# Patient Record
Sex: FEMALE | Race: ASIAN | NOT HISPANIC OR LATINO | Employment: FULL TIME | URBAN - METROPOLITAN AREA
[De-identification: names, ages, dates, MRNs, and addresses within clinical notes are randomized per-mention and may not be internally consistent; named-entity substitution may affect disease eponyms.]

---

## 2017-02-14 ENCOUNTER — APPOINTMENT (OUTPATIENT)
Dept: INTERNAL MEDICINE | Facility: MEDICAL CENTER | Age: 62
End: 2017-02-14
Payer: COMMERCIAL

## 2017-04-17 PROBLEM — N20.0 NEPHROLITHIASIS: Status: ACTIVE | Noted: 2017-04-17

## 2017-04-17 PROBLEM — Z00.00 HEALTHCARE MAINTENANCE: Status: ACTIVE | Noted: 2017-04-17

## 2017-04-18 ENCOUNTER — OFFICE VISIT (OUTPATIENT)
Dept: INTERNAL MEDICINE | Facility: MEDICAL CENTER | Age: 62
End: 2017-04-18
Payer: COMMERCIAL

## 2017-04-18 VITALS
SYSTOLIC BLOOD PRESSURE: 130 MMHG | BODY MASS INDEX: 22.86 KG/M2 | TEMPERATURE: 97.9 F | OXYGEN SATURATION: 98 % | HEART RATE: 69 BPM | DIASTOLIC BLOOD PRESSURE: 86 MMHG | WEIGHT: 124.2 LBS | HEIGHT: 62 IN

## 2017-04-18 DIAGNOSIS — N20.0 NEPHROLITHIASIS: ICD-10-CM

## 2017-04-18 DIAGNOSIS — R20.0 HAND NUMBNESS: ICD-10-CM

## 2017-04-18 DIAGNOSIS — N83.209 CYST OF OVARY, UNSPECIFIED LATERALITY: ICD-10-CM

## 2017-04-18 DIAGNOSIS — M85.9 DISORDER OF BONE DENSITY AND STRUCTURE, UNSPECIFIED: ICD-10-CM

## 2017-04-18 DIAGNOSIS — Z12.4 ROUTINE CERVICAL SMEAR: ICD-10-CM

## 2017-04-18 DIAGNOSIS — M81.0 OSTEOPOROSIS: ICD-10-CM

## 2017-04-18 DIAGNOSIS — Z00.00 HEALTHCARE MAINTENANCE: ICD-10-CM

## 2017-04-18 DIAGNOSIS — M77.11 LATERAL EPICONDYLITIS OF RIGHT ELBOW: ICD-10-CM

## 2017-04-18 DIAGNOSIS — Z01.419 PAP SMEAR, LOW-RISK: ICD-10-CM

## 2017-04-18 DIAGNOSIS — K63.5 COLON POLYP: ICD-10-CM

## 2017-04-18 DIAGNOSIS — E03.9 ACQUIRED HYPOTHYROIDISM: ICD-10-CM

## 2017-04-18 DIAGNOSIS — Z12.31 VISIT FOR SCREENING MAMMOGRAM: ICD-10-CM

## 2017-04-18 DIAGNOSIS — E78.5 DYSLIPIDEMIA: ICD-10-CM

## 2017-04-18 DIAGNOSIS — M54.50 LOW BACK PAIN WITHOUT SCIATICA, UNSPECIFIED BACK PAIN LATERALITY, UNSPECIFIED CHRONICITY: ICD-10-CM

## 2017-04-18 PROCEDURE — 99000 SPECIMEN HANDLING OFFICE-LAB: CPT | Performed by: INTERNAL MEDICINE

## 2017-04-18 PROCEDURE — 99396 PREV VISIT EST AGE 40-64: CPT | Performed by: INTERNAL MEDICINE

## 2017-04-18 NOTE — ASSESSMENT & PLAN NOTE
Patient is feeling well on their current thyroid dose. The most recent thyroid function tests were normal. No unusual fatigue, skin changes, depression, constipation. Patient is compliant with medication. Takes medication as directed on an empty stomach.

## 2017-04-18 NOTE — ASSESSMENT & PLAN NOTE
Patient denies any signs/symptoms of cardiovascular disease. Denies chest pain/pressure; denies numbness/weakness or difficulty with speech/swallowing or sudden change in vision.

## 2017-04-18 NOTE — PROGRESS NOTES
Established Patient    Jasmin Gates is a 62 y.o. female who presents today with the following:    CC: Here for yearly visit. Only complaint is arm pain which seems to be improving    HPI:    Acquired hypothyroidism  Patient is feeling well on their current thyroid dose. The most recent thyroid function tests were normal. No unusual fatigue, skin changes, depression, constipation. Patient is compliant with medication. Takes medication as directed on an empty stomach.          Dyslipidemia  Patient denies any signs/symptoms of cardiovascular disease. Denies chest pain/pressure; denies numbness/weakness or difficulty with speech/swallowing or sudden change in vision.         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     Nephrolithiasis  Not so good about lots water- but no flank pain.     Hand numbness  Hx CTS>>better    Low back pain  Better with yoga tiw    Osteoporosis  No falls or frxs.  No SE with meds.     Colon polyp  Due for f/u- no blood or BM changes.     Healthcare maintenance  Sees eye doc and dentist    Reg exercise and eats well.     Ovarian cyst  Denies any further pain or problems        ROS: As per HPI. Additional pertinent symptoms as noted below.    ROS:  Constitutional ROS: No unexpected change in weight, No weakness, No fatigue  Eye ROS: No recent significant change in vision  Pulmonary ROS: No shortness of breath, No recent change in breathing  Cardiovascular ROS: No chest pain, No edema, No palpitations, No syncope  Gastrointestinal ROS: No abdominal pain, No nausea, vomiting, diarrhea, or constipation  Psychiatric ROS: No depression, No anxiety    Patient Active Problem List    Diagnosis Date Noted   • Lateral epicondylitis of right elbow 04/18/2017   • Colon polyp 04/18/2017   • Nephrolithiasis 04/17/2017   • Healthcare maintenance 04/17/2017   • Hand numbness 07/11/2016   • Low back pain 07/11/2016   •  "Ovarian cyst 07/11/2016   • Dry eye of left side 07/08/2016   • Acquired hypothyroidism 07/08/2016   • Dyslipidemia 07/08/2016   • Osteoporosis 07/08/2016       Social History   Substance Use Topics   • Smoking status: Never Smoker    • Smokeless tobacco: Never Used   • Alcohol Use: No       Current Outpatient Prescriptions   Medication Sig Dispense Refill   • alendronate (FOSAMAX) 70 MG Tab Take 70 mg by mouth every 7 days.     • Magnesium 400 MG Cap Take  by mouth.     • Red Yeast Rice Extract (RED YEAST RICE PO) Take 1,200 mg by mouth every day.     • levothyroxine (SYNTHROID) 50 MCG TABS Take 50 mcg by mouth every day.     • Multiple Vitamins-Minerals (CENTRUM PO) Take  by mouth.     • Calcium Citrate (CITRACAL PO) Take  by mouth.     • Omega-3 Fatty Acids (FISH OIL PO) Take  by mouth.     • Cholecalciferol (VITAMIN D-3 PO) Take 2,000 Units by mouth every day.     • Coenzyme Q10 (COQ10 PO) Take  by mouth.     • Multiple Vitamins-Minerals (PRESERVISION AREDS 2 PO) Take  by mouth.       No current facility-administered medications for this visit.       /86 mmHg  Pulse 69  Temp(Src) 36.6 °C (97.9 °F)  Ht 1.581 m (5' 2.25\")  Wt 56.337 kg (124 lb 3.2 oz)  BMI 22.54 kg/m2  SpO2 98%    Physical Exam   General:   No distress.  Normal appearing.  HEENT: Pupils are equal.  Conjunctiva is normal.  Head is normal appearing.  Ears, canals and tympanic membranes are normal.  Oral cavity is pink and moist without lesion.  Neck: Supple without JVD or bruit.  Thyroid is not enlarged.  Pulmonary: Clear to auscultation, with good breath sounds.  Cardiovascular regular rate and rhythm.  No murmur auscultated. No carotid bruits.  Abdomen: Soft, nontender, nondistended.  Normal bowel sounds.  Organs are not enlarged.  Neurologic: Cranial nerves intact.  Strength and sensation are normal.  Skin: No obvious lesions.  Lymph: No cervical, supraclavicular, axillary nodes noted.  Genitourinary: External genitalia are normal in " appearance. Speculum exam- normal cervix and mucosa. Pap collected   Breast: Bilateral breasts are normal in appearance. Nipple and areola are normal in appearance. No abnormal skin texture. No masses   Right arm tender over the lateral epicondyle particularly with internal/external rotation of the arm              Assessment and Plan      1. Acquired hypothyroidism  Thyroid labs are within range. Patient is taking meds as prescribed first thing in morning without food. And has no signs or symptoms of lower hypothyroid.      - TSH WITH REFLEX TO FT4; Future    2. Dyslipidemia  Lipids are in reasonable range. The patient to continue paying attention to diet and exercise. Patient aware to go to emergency department or call 911 if any signs of cardiovascular disease- chest pain or pressure, sudden numbness or weakness in an arm or leg, sudden loss of ability to talk or swallow.      - COMP METABOLIC PANEL; Future  - LIPID PROFILE; Future    3. Visit for screening mammogram    - MA-SCREEN MAMMO W/CAD-BILAT; Future    4. Disorder of bone density and structure, unspecified    - DS-BONE DENSITY STUDY (DEXA); Future    5. Routine cervical smear    - THINPREP PAP WITH HPV; Future    6. Lateral epicondylitis of right elbow  Information given. Seems to be resolving. Recommend tennis elbow band. Call if not better.    7. Nephrolithiasis  Encourage being better about fluids    8. Hand numbness  History carpal tunnel now resolved    9. Low back pain without sciatica, unspecified back pain laterality, unspecified chronicity  Resolved with yoga    10. Osteoporosis  No falls or fractures on Fosamax DEXA scan due    11. Colon polyp  No change in bowel habits screening due per patient.  - REFERRAL TO GASTROENTEROLOGY    12. Healthcare maintenance  Up-to-date with vision and dental screening. Good about exercise and diet. Lives with her father. She cares  States life is good denies depression spends time with family    13. Cyst of  ovary, unspecified laterality  Seemingly resolved by exam and history      Return in about 1 year (around 4/18/2018).      Signed by: Tayla Wallace M.D.    This note was created using voice recognition software. There may be unintended errors in spelling, grammar or content.

## 2017-04-18 NOTE — MR AVS SNAPSHOT
"        Jasmin Gates   2017 3:20 PM   Office Visit   MRN: [id number]    Department:  [facility]   Dept Phone:  [phone]    Description:  Female : 1955   Provider:  Tayla Wallace M.D.           Reason for Visit     Gynecologic Exam Pap smear     Arm Pain x 3 months     Referral Needed Colon with GI Cons       Allergies as of 2017     No Known Allergies      You were diagnosed with     Acquired hypothyroidism   [6691566]       Dyslipidemia   [820466]       Visit for screening mammogram   [169137]       Disorder of bone density and structure, unspecified   [8271136]       Routine cervical smear   [258393]       Lateral epicondylitis of right elbow   [734759]       Nephrolithiasis   [254181]       Hand numbness   [634061]       Low back pain without sciatica, unspecified back pain laterality, unspecified chronicity   [1552430]       Osteoporosis   [9697105]       Colon polyp   [218942]       Healthcare maintenance   [685630]       Cyst of ovary, unspecified laterality   [0322777]       Pap smear, low-risk   [581162]         Vital Signs     Blood Pressure Pulse Temperature Height Weight Body Mass Index    130/86 mmHg 69 36.6 °C (97.9 °F) 1.581 m (5' 2.25\") 56.337 kg (124 lb 3.2 oz) 22.54 kg/m2    Oxygen Saturation Smoking Status                98% Never Smoker           Basic Information     Date Of Birth Sex Race Ethnicity Preferred Language    1955 Female  Non- English      Problem List              ICD-10-CM Priority Class Noted - Resolved    Acquired hypothyroidism E03.9   2016 - Present    Dyslipidemia E78.5   2016 - Present    Osteoporosis M81.0   2016 - Present    Nephrolithiasis N20.0   2017 - Present    Healthcare maintenance Z00.00   2017 - Present    Lateral epicondylitis of right elbow M77.11   2017 - Present    Colon polyp K63.5   2017 - Present      Health Maintenance        Date Due Completion Dates    PAP SMEAR 1978 " ---    MAMMOGRAM 3/18/2017 3/18/2016, 3/17/2015, 7/20/2007, 7/20/2007, 7/5/2006, 6/28/2005    IMM DTaP/Tdap/Td Vaccine (2 - Td) 7/27/2017 7/27/2007    COLONOSCOPY 1/18/2022 1/18/2012            Current Immunizations     Hepatitis A Vaccine, Adult 1/30/2008, 7/27/2007    Hepatitis B Vaccine Recombivax (Adol/Adult) 3/3/1999, 9/30/1998, 8/26/1998    Influenza Vaccine Quad Inj (Pf) 11/3/2016 12:23 PM, 11/5/2015  9:53 AM    Influenza Vaccine Quad Inj (Preserved) 11/3/2016, 10/7/2014    Pneumococcal polysaccharide vaccine (PPSV-23) 10/15/1997    SHINGLES VACCINE 8/7/2013    Tdap Vaccine 7/27/2007      Below and/or attached are the medications your provider expects you to take. Review all of your home medications and newly ordered medications with your provider and/or pharmacist. Follow medication instructions as directed by your provider and/or pharmacist. Please keep your medication list with you and share with your provider. Update the information when medications are discontinued, doses are changed, or new medications (including over-the-counter products) are added; and carry medication information at all times in the event of emergency situations     Allergies:  No Known Allergies          Medications  Valid as of: April 19, 2017 -  3:30 PM    Generic Name Brand Name Tablet Size Instructions for use    Alendronate Sodium (Tab) FOSAMAX 70 MG Take 70 mg by mouth every 7 days.        Calcium Citrate   Take  by mouth.        Cholecalciferol   Take 2,000 Units by mouth every day.        Coenzyme Q10   Take  by mouth.        Levothyroxine Sodium (Tab) SYNTHROID 50 MCG Take 50 mcg by mouth every day.        Magnesium (Cap) Magnesium 400 MG Take  by mouth.        Multiple Vitamins-Minerals   Take  by mouth.        Multiple Vitamins-Minerals   Take  by mouth.        Omega-3 Fatty Acids   Take  by mouth.        Red Yeast Rice Extract   Take 1,200 mg by mouth every day.        .                 Medicines prescribed today were  sent to:     [id number] [place], [place] - [address] [facility]    [address] [place] [address] [place] [address]    Phone: [phone] Fax: [phone]    Open 24 Hours?: No      Medication refill instructions:       If your prescription bottle indicates you have medication refills left, it is not necessary to call your provider’s office. Please contact your pharmacy and they will refill your medication.    If your prescription bottle indicates you do not have any refills left, you may request refills at any time through one of the following ways: The online [employer] system (except Urgent Care), by calling your provider’s office, or by asking your pharmacy to contact your provider’s office with a refill request. Medication refills are processed only during regular business hours and may not be available until the next business day. Your provider may request additional information or to have a follow-up visit with you prior to refilling your medication.   *Please Note: Medication refills are assigned a new Rx number when refilled electronically. Your pharmacy may indicate that no refills were authorized even though a new prescription for the same medication is available at the pharmacy. Please request the medicine by name with the pharmacy before contacting your provider for a refill.        Your To Do List     Future Labs/Procedures Complete By Expires    COMP METABOLIC PANEL  As directed 4/19/2018    DS-BONE DENSITY STUDY (DEXA)  As directed 10/19/2017    LIPID PROFILE  As directed 4/19/2018    MA-SCREEN MAMMO W/CAD-BILAT  As directed 5/20/2018    THINPREP PAP WITH HPV  As directed 4/19/2018    TSH WITH REFLEX TO FT4  As directed 4/18/2018      Referral     A referral request has been sent to our patient care coordination department. Please allow 3-5 business days for us to process this request and contact you either by phone or mail. If you do not hear from us by the 5th business day, please call us at [phone].           Instructions    Tennis Elbow  Tennis elbow (lateral epicondylitis) is inflammation of the outer tendons of your forearm close to your elbow. Your tendons attach your muscles to your bones. The outer tendons of your forearm are used to extend your wrist, and they attach on the outside part of your elbow. Tennis elbow is often found in people who play tennis, but anyone may get the condition from repeatedly extending the wrist or turning the forearm.  CAUSES  This condition is caused by repeatedly extending your wrist and using your hands. It can result from sports or work that requires repetitive forearm movements. Tennis elbow may also be caused by an injury.  RISK FACTORS  You have a higher risk of developing tennis elbow if you play tennis or another racquet sport. You also have a higher risk if you frequently use your hands for work. This condition is also more likely to develop in:  · Musicians.  · , painters, and plumbers.  · Cooks.  · [place].  · People who work in factories.  · Construction workers.  · Butchers.  · People who use computers.  SYMPTOMS  Symptoms of this condition include:  · Pain and tenderness in your forearm and the outer part of your elbow. You may only feel the pain when you use your arm, or you may feel it even when you are not using your arm.  · A burning feeling that runs from your elbow through your arm.  · Weak  in your hands.  DIAGNOSIS   This condition may be diagnosed by medical history and physical exam. You may also have other tests, including:  · X-rays.  · MRI.  TREATMENT  Your health care provider will recommend lifestyle adjustments, such as resting and icing your arm. Treatment may also include:  · Medicines for inflammation. This may include shots of cortisone if your pain continues.  · Physical therapy. This may include massage or exercises.  · An elbow brace.  Surgery may eventually be recommended if your pain does not go away with treatment.  HOME CARE  INSTRUCTIONS  Activity  · Rest your elbow and wrist as directed by your health care provider. Try to avoid any activities that caused the problem until your health care provider says that you can do them again.  · If a physical therapist teaches you exercises, do all of them as directed.  · If you lift an object, lift it with your palm facing upward. This lowers the stress on your elbow.  Lifestyle  · If your tennis elbow is caused by sports, check your equipment and make sure that:  ¨ You are using it correctly.  ¨ It is the best fit for you.  · If your tennis elbow is caused by work, take breaks frequently, if you are able. Talk with your manager about how to best perform tasks in a way that is safe.  ¨ If your tennis elbow is caused by computer use, talk with your manager about any changes that can be made to your work environment.  General Instructions  · If directed, apply ice to the painful area:  ¨ Put ice in a plastic bag.  ¨ Place a towel between your skin and the bag.  ¨ Leave the ice on for 20 minutes, 2-3 times per day.  · Take medicines only as directed by your health care provider.  · If you were given a brace, wear it as directed by your health care provider.  · Keep all follow-up visits as directed by your health care provider. This is important.  SEEK MEDICAL CARE IF:  · Your pain does not get better with treatment.  · Your pain gets worse.  · You have numbness or weakness in your forearm, hand, or fingers.     This information is not intended to replace advice given to you by your health care provider. Make sure you discuss any questions you have with your health care provider.     Document Released: 12/18/2006 Document Revised: 05/03/2016 Document Reviewed: 12/14/2015  [email or web address] Interactive Patient Education ©2016 [email or web address]              MyChart Status: Patient Declined

## 2017-04-18 NOTE — PATIENT INSTRUCTIONS
Tennis Elbow  Tennis elbow (lateral epicondylitis) is inflammation of the outer tendons of your forearm close to your elbow. Your tendons attach your muscles to your bones. The outer tendons of your forearm are used to extend your wrist, and they attach on the outside part of your elbow. Tennis elbow is often found in people who play tennis, but anyone may get the condition from repeatedly extending the wrist or turning the forearm.  CAUSES  This condition is caused by repeatedly extending your wrist and using your hands. It can result from sports or work that requires repetitive forearm movements. Tennis elbow may also be caused by an injury.  RISK FACTORS  You have a higher risk of developing tennis elbow if you play tennis or another racquet sport. You also have a higher risk if you frequently use your hands for work. This condition is also more likely to develop in:  · Musicians.  · , painters, and plumbers.  · Cooks.  · [place].  · People who work in factories.  · Construction workers.  · Butchers.  · People who use computers.  SYMPTOMS  Symptoms of this condition include:  · Pain and tenderness in your forearm and the outer part of your elbow. You may only feel the pain when you use your arm, or you may feel it even when you are not using your arm.  · A burning feeling that runs from your elbow through your arm.  · Weak  in your hands.  DIAGNOSIS   This condition may be diagnosed by medical history and physical exam. You may also have other tests, including:  · X-rays.  · MRI.  TREATMENT  Your health care provider will recommend lifestyle adjustments, such as resting and icing your arm. Treatment may also include:  · Medicines for inflammation. This may include shots of cortisone if your pain continues.  · Physical therapy. This may include massage or exercises.  · An elbow brace.  Surgery may eventually be recommended if your pain does not go away with treatment.  HOME CARE  INSTRUCTIONS  Activity  · Rest your elbow and wrist as directed by your health care provider. Try to avoid any activities that caused the problem until your health care provider says that you can do them again.  · If a physical therapist teaches you exercises, do all of them as directed.  · If you lift an object, lift it with your palm facing upward. This lowers the stress on your elbow.  Lifestyle  · If your tennis elbow is caused by sports, check your equipment and make sure that:  ¨ You are using it correctly.  ¨ It is the best fit for you.  · If your tennis elbow is caused by work, take breaks frequently, if you are able. Talk with your manager about how to best perform tasks in a way that is safe.  ¨ If your tennis elbow is caused by computer use, talk with your manager about any changes that can be made to your work environment.  General Instructions  · If directed, apply ice to the painful area:  ¨ Put ice in a plastic bag.  ¨ Place a towel between your skin and the bag.  ¨ Leave the ice on for 20 minutes, 2-3 times per day.  · Take medicines only as directed by your health care provider.  · If you were given a brace, wear it as directed by your health care provider.  · Keep all follow-up visits as directed by your health care provider. This is important.  SEEK MEDICAL CARE IF:  · Your pain does not get better with treatment.  · Your pain gets worse.  · You have numbness or weakness in your forearm, hand, or fingers.     This information is not intended to replace advice given to you by your health care provider. Make sure you discuss any questions you have with your health care provider.     Document Released: 12/18/2006 Document Revised: 05/03/2016 Document Reviewed: 12/14/2015  [employer] Interactive Patient Education ©2016 [employer]

## 2017-04-19 ENCOUNTER — TELEPHONE (OUTPATIENT)
Dept: INTERNAL MEDICINE | Facility: MEDICAL CENTER | Age: 62
End: 2017-04-19

## 2017-04-20 ENCOUNTER — TELEPHONE (OUTPATIENT)
Dept: INTERNAL MEDICINE | Facility: MEDICAL CENTER | Age: 62
End: 2017-04-20

## 2017-04-20 RX ORDER — ALENDRONATE SODIUM 70 MG/1
70 TABLET ORAL
Qty: 4 TAB | Refills: 12 | Status: SHIPPED | OUTPATIENT
Start: 2017-04-20 | End: 2018-04-24 | Stop reason: SDUPTHER

## 2017-04-20 RX ORDER — LEVOTHYROXINE SODIUM 0.05 MG/1
50 TABLET ORAL DAILY
Qty: 90 TAB | Refills: 0 | Status: SHIPPED | OUTPATIENT
Start: 2017-04-20 | End: 2017-07-24 | Stop reason: SDUPTHER

## 2017-04-20 NOTE — TELEPHONE ENCOUNTER
1. Caller Name: Jasmin Gates                      Call Back Number: [phone] (home)     2. Message: Says she didn't get any info on the eye referral from her visit and Alendronate & Synthroid didn' t get sent to pharmacy.     3. Patient approves office to leave a detailed voicemail/MyChart message: yes

## 2017-04-20 NOTE — TELEPHONE ENCOUNTER
See prior phone note. Patient needs to just schedule with an optometrist for routine eye exam. She does not need a referral to ophthalmologist she does not have a medical problem.  Prescription sent to pharmacy she has a lab order to complete to get more refills on her thyroid medication

## 2017-04-20 NOTE — TELEPHONE ENCOUNTER
Spoke to pt and let her know. She still has lab orders from appt and will go to [facility] to get done.

## 2017-04-20 NOTE — TELEPHONE ENCOUNTER
req for eye doc ref came as ?staff msg?- anyway pt does not have a medical eye condition- so need to simply se her optometrist for reg visit. No ref for that.

## 2017-04-26 DIAGNOSIS — Z12.4 ROUTINE CERVICAL SMEAR: ICD-10-CM

## 2017-04-28 DIAGNOSIS — E78.5 DYSLIPIDEMIA: ICD-10-CM

## 2017-04-28 DIAGNOSIS — E03.9 ACQUIRED HYPOTHYROIDISM: ICD-10-CM

## 2017-04-28 NOTE — PROGRESS NOTES
Quick Note:    Chol bit high- but since no other CV risks (no DM or htn)- no need for meds- but do watch diet and exercise.  ______

## 2017-07-24 RX ORDER — LEVOTHYROXINE SODIUM 0.05 MG/1
50 TABLET ORAL DAILY
Qty: 90 TAB | Refills: 1 | Status: SHIPPED | OUTPATIENT
Start: 2017-07-24 | End: 2018-02-06 | Stop reason: SDUPTHER

## 2017-07-24 NOTE — TELEPHONE ENCOUNTER
Last seen: 04/18/17 by Dr. Wallace  Next appt: None     Was the patient seen in the last year in this department? Yes   Does patient have an active prescription for medications requested? No   Received Request Via: Pharmacy

## 2018-02-06 RX ORDER — LEVOTHYROXINE SODIUM 0.05 MG/1
50 TABLET ORAL DAILY
Qty: 90 TAB | Refills: 0 | Status: SHIPPED | OUTPATIENT
Start: 2018-02-06 | End: 2018-04-24 | Stop reason: SDUPTHER

## 2018-02-06 NOTE — TELEPHONE ENCOUNTER
Last seen: 04/18/17 by Dr. Wallace  Next appt: 04/24/18 with Dr. Mckeon    Was the patient seen in the last year in this department? Yes   Does patient have an active prescription for medications requested? No   Received Request Via: Pharmacy

## 2018-04-24 ENCOUNTER — OFFICE VISIT (OUTPATIENT)
Dept: INTERNAL MEDICINE | Facility: MEDICAL CENTER | Age: 63
End: 2018-04-24
Payer: COMMERCIAL

## 2018-04-24 VITALS
OXYGEN SATURATION: 96 % | DIASTOLIC BLOOD PRESSURE: 82 MMHG | BODY MASS INDEX: 22.26 KG/M2 | TEMPERATURE: 96.4 F | HEIGHT: 62 IN | HEART RATE: 70 BPM | SYSTOLIC BLOOD PRESSURE: 139 MMHG | WEIGHT: 121 LBS

## 2018-04-24 DIAGNOSIS — Z12.39 SCREENING FOR BREAST CANCER: ICD-10-CM

## 2018-04-24 DIAGNOSIS — E78.5 DYSLIPIDEMIA: ICD-10-CM

## 2018-04-24 DIAGNOSIS — E03.9 ACQUIRED HYPOTHYROIDISM: ICD-10-CM

## 2018-04-24 DIAGNOSIS — Z12.11 SCREEN FOR COLON CANCER: ICD-10-CM

## 2018-04-24 DIAGNOSIS — Z00.00 HEALTHCARE MAINTENANCE: ICD-10-CM

## 2018-04-24 DIAGNOSIS — M81.0 OSTEOPOROSIS, UNSPECIFIED OSTEOPOROSIS TYPE, UNSPECIFIED PATHOLOGICAL FRACTURE PRESENCE: ICD-10-CM

## 2018-04-24 PROCEDURE — 99396 PREV VISIT EST AGE 40-64: CPT | Performed by: INTERNAL MEDICINE

## 2018-04-24 RX ORDER — ALENDRONATE SODIUM 70 MG/1
70 TABLET ORAL
Qty: 12 TAB | Refills: 3 | Status: SHIPPED | OUTPATIENT
Start: 2018-04-24 | End: 2019-04-17 | Stop reason: SDUPTHER

## 2018-04-24 RX ORDER — LEVOTHYROXINE SODIUM 0.05 MG/1
50 TABLET ORAL DAILY
Qty: 90 TAB | Refills: 3 | Status: SHIPPED | OUTPATIENT
Start: 2018-04-24 | End: 2019-04-17 | Stop reason: SDUPTHER

## 2018-04-24 ASSESSMENT — PATIENT HEALTH QUESTIONNAIRE - PHQ9: CLINICAL INTERPRETATION OF PHQ2 SCORE: 0
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
## 2018-05-15 ENCOUNTER — HOSPITAL ENCOUNTER (OUTPATIENT)
Dept: RADIOLOGY | Facility: MEDICAL CENTER | Age: 63
End: 2018-05-15
Attending: INTERNAL MEDICINE
Payer: COMMERCIAL

## 2018-05-15 DIAGNOSIS — M81.0 OSTEOPOROSIS, UNSPECIFIED OSTEOPOROSIS TYPE, UNSPECIFIED PATHOLOGICAL FRACTURE PRESENCE: ICD-10-CM

## 2018-05-15 DIAGNOSIS — Z12.39 SCREENING FOR BREAST CANCER: ICD-10-CM

## 2018-05-15 DIAGNOSIS — Z00.00 HEALTHCARE MAINTENANCE: ICD-10-CM

## 2018-05-15 PROCEDURE — 77080 DXA BONE DENSITY AXIAL: CPT

## 2018-05-15 PROCEDURE — 77063 BREAST TOMOSYNTHESIS BI: CPT

## 2018-09-20 DIAGNOSIS — M81.0 OSTEOPOROSIS WITHOUT CURRENT PATHOLOGICAL FRACTURE, UNSPECIFIED OSTEOPOROSIS TYPE: ICD-10-CM

## 2018-09-20 DIAGNOSIS — Z00.00 HEALTHCARE MAINTENANCE: ICD-10-CM

## 2018-09-20 DIAGNOSIS — E78.5 HYPERLIPIDEMIA, UNSPECIFIED HYPERLIPIDEMIA TYPE: ICD-10-CM

## 2018-09-20 DIAGNOSIS — E03.9 HYPOTHYROIDISM, UNSPECIFIED TYPE: ICD-10-CM

## 2019-03-12 ENCOUNTER — HOSPITAL ENCOUNTER (OUTPATIENT)
Dept: LAB | Facility: MEDICAL CENTER | Age: 64
End: 2019-03-12
Attending: INTERNAL MEDICINE
Payer: COMMERCIAL

## 2019-03-12 DIAGNOSIS — E78.5 HYPERLIPIDEMIA, UNSPECIFIED HYPERLIPIDEMIA TYPE: ICD-10-CM

## 2019-03-12 DIAGNOSIS — Z00.00 HEALTHCARE MAINTENANCE: ICD-10-CM

## 2019-03-12 DIAGNOSIS — M81.0 OSTEOPOROSIS WITHOUT CURRENT PATHOLOGICAL FRACTURE, UNSPECIFIED OSTEOPOROSIS TYPE: ICD-10-CM

## 2019-03-12 DIAGNOSIS — E03.9 HYPOTHYROIDISM, UNSPECIFIED TYPE: ICD-10-CM

## 2019-03-12 LAB
25(OH)D3 SERPL-MCNC: 37 NG/ML (ref 30–100)
ALBUMIN SERPL BCP-MCNC: 4.6 G/DL (ref 3.2–4.9)
ALBUMIN/GLOB SERPL: 1.6 G/DL
ALP SERPL-CCNC: 76 U/L (ref 30–99)
ALT SERPL-CCNC: 27 U/L (ref 2–50)
ANION GAP SERPL CALC-SCNC: 7 MMOL/L (ref 0–11.9)
AST SERPL-CCNC: 27 U/L (ref 12–45)
BASOPHILS # BLD AUTO: 0.9 % (ref 0–1.8)
BASOPHILS # BLD: 0.03 K/UL (ref 0–0.12)
BILIRUB SERPL-MCNC: 0.9 MG/DL (ref 0.1–1.5)
BUN SERPL-MCNC: 12 MG/DL (ref 8–22)
CALCIUM SERPL-MCNC: 9.8 MG/DL (ref 8.5–10.5)
CHLORIDE SERPL-SCNC: 103 MMOL/L (ref 96–112)
CHOLEST SERPL-MCNC: 288 MG/DL (ref 100–199)
CO2 SERPL-SCNC: 28 MMOL/L (ref 20–33)
CREAT SERPL-MCNC: 0.79 MG/DL (ref 0.5–1.4)
EOSINOPHIL # BLD AUTO: 0.01 K/UL (ref 0–0.51)
EOSINOPHIL NFR BLD: 0.3 % (ref 0–6.9)
ERYTHROCYTE [DISTWIDTH] IN BLOOD BY AUTOMATED COUNT: 44.8 FL (ref 35.9–50)
FASTING STATUS PATIENT QL REPORTED: NORMAL
GLOBULIN SER CALC-MCNC: 2.9 G/DL (ref 1.9–3.5)
GLUCOSE SERPL-MCNC: 92 MG/DL (ref 65–99)
HCT VFR BLD AUTO: 44.4 % (ref 37–47)
HDLC SERPL-MCNC: 67 MG/DL
HGB BLD-MCNC: 14.7 G/DL (ref 12–16)
IMM GRANULOCYTES # BLD AUTO: 0 K/UL (ref 0–0.11)
IMM GRANULOCYTES NFR BLD AUTO: 0 % (ref 0–0.9)
LDLC SERPL CALC-MCNC: 190 MG/DL
LYMPHOCYTES # BLD AUTO: 1.34 K/UL (ref 1–4.8)
LYMPHOCYTES NFR BLD: 38.1 % (ref 22–41)
MCH RBC QN AUTO: 31.6 PG (ref 27–33)
MCHC RBC AUTO-ENTMCNC: 33.1 G/DL (ref 33.6–35)
MCV RBC AUTO: 95.5 FL (ref 81.4–97.8)
MONOCYTES # BLD AUTO: 0.28 K/UL (ref 0–0.85)
MONOCYTES NFR BLD AUTO: 8 % (ref 0–13.4)
NEUTROPHILS # BLD AUTO: 1.86 K/UL (ref 2–7.15)
NEUTROPHILS NFR BLD: 52.7 % (ref 44–72)
NRBC # BLD AUTO: 0 K/UL
NRBC BLD-RTO: 0 /100 WBC
PLATELET # BLD AUTO: 243 K/UL (ref 164–446)
PMV BLD AUTO: 10.3 FL (ref 9–12.9)
POTASSIUM SERPL-SCNC: 3.9 MMOL/L (ref 3.6–5.5)
PROT SERPL-MCNC: 7.5 G/DL (ref 6–8.2)
RBC # BLD AUTO: 4.65 M/UL (ref 4.2–5.4)
SODIUM SERPL-SCNC: 138 MMOL/L (ref 135–145)
TRIGL SERPL-MCNC: 157 MG/DL (ref 0–149)
TSH SERPL DL<=0.005 MIU/L-ACNC: 2.13 UIU/ML (ref 0.38–5.33)
WBC # BLD AUTO: 3.5 K/UL (ref 4.8–10.8)

## 2019-03-12 PROCEDURE — 80061 LIPID PANEL: CPT

## 2019-03-12 PROCEDURE — 36415 COLL VENOUS BLD VENIPUNCTURE: CPT

## 2019-03-12 PROCEDURE — 80053 COMPREHEN METABOLIC PANEL: CPT

## 2019-03-12 PROCEDURE — 84443 ASSAY THYROID STIM HORMONE: CPT

## 2019-03-12 PROCEDURE — 85025 COMPLETE CBC W/AUTO DIFF WBC: CPT

## 2019-03-12 PROCEDURE — 82306 VITAMIN D 25 HYDROXY: CPT

## 2019-04-17 DIAGNOSIS — E03.9 ACQUIRED HYPOTHYROIDISM: ICD-10-CM

## 2019-04-17 DIAGNOSIS — M81.0 OSTEOPOROSIS, UNSPECIFIED OSTEOPOROSIS TYPE, UNSPECIFIED PATHOLOGICAL FRACTURE PRESENCE: ICD-10-CM

## 2019-04-17 RX ORDER — ALENDRONATE SODIUM 70 MG/1
70 TABLET ORAL
Qty: 12 TAB | Refills: 3 | Status: SHIPPED | OUTPATIENT
Start: 2019-04-17 | End: 2022-04-25

## 2019-04-17 RX ORDER — LEVOTHYROXINE SODIUM 0.05 MG/1
50 TABLET ORAL DAILY
Qty: 90 TAB | Refills: 3 | Status: SHIPPED | OUTPATIENT
Start: 2019-04-17 | End: 2021-10-28

## 2019-04-17 NOTE — TELEPHONE ENCOUNTER
Last seen: 04/24/18 by Dr. Mckeon  Next appt: 05/15/19 with Dr. Mckeon    Was the patient seen in the last year in this department? Yes   Does patient have an active prescription for medications requested? No   Received Request Via: Pharmacy

## 2019-05-15 ENCOUNTER — OFFICE VISIT (OUTPATIENT)
Dept: INTERNAL MEDICINE | Facility: MEDICAL CENTER | Age: 64
End: 2019-05-15
Payer: COMMERCIAL

## 2019-05-15 VITALS
SYSTOLIC BLOOD PRESSURE: 116 MMHG | DIASTOLIC BLOOD PRESSURE: 75 MMHG | TEMPERATURE: 98.9 F | BODY MASS INDEX: 22.26 KG/M2 | OXYGEN SATURATION: 96 % | WEIGHT: 121 LBS | HEIGHT: 62 IN | HEART RATE: 83 BPM

## 2019-05-15 DIAGNOSIS — E78.5 DYSLIPIDEMIA: ICD-10-CM

## 2019-05-15 DIAGNOSIS — E03.9 ACQUIRED HYPOTHYROIDISM: ICD-10-CM

## 2019-05-15 DIAGNOSIS — Z12.39 SCREENING FOR BREAST CANCER: ICD-10-CM

## 2019-05-15 DIAGNOSIS — M81.0 OSTEOPOROSIS, UNSPECIFIED OSTEOPOROSIS TYPE, UNSPECIFIED PATHOLOGICAL FRACTURE PRESENCE: ICD-10-CM

## 2019-05-15 DIAGNOSIS — Z00.00 HEALTHCARE MAINTENANCE: ICD-10-CM

## 2019-05-15 PROCEDURE — 99396 PREV VISIT EST AGE 40-64: CPT | Performed by: INTERNAL MEDICINE

## 2019-05-15 RX ORDER — ATORVASTATIN CALCIUM 20 MG/1
20 TABLET, FILM COATED ORAL DAILY
Qty: 30 TAB | Refills: 3 | Status: SHIPPED | OUTPATIENT
Start: 2019-05-15 | End: 2022-08-29

## 2019-05-15 ASSESSMENT — PATIENT HEALTH QUESTIONNAIRE - PHQ9: CLINICAL INTERPRETATION OF PHQ2 SCORE: 0

## 2019-05-15 NOTE — PROGRESS NOTES
Jasmin Gates is a 64 y.o. female who is here for an annual visit today.    CC: Annual visit, history of hypothyroidism, dyslipidemia    HPI:    Patient is a 64-year-old female who is here for an annual visit today.  She has a history of hypothyroidism, dyslipidemia, osteoporosis.  She does not have any acute complaints today.  She did have her labs done which were stable except for the cholesterol levels which have increased significantly.  We discussed possibly starting cholesterol medication and she is open to that idea.  She says she was started on a medication in the past but then stopped taking them because her cholesterol levels were a lot better.  She is also taking coenzyme Q 10, red yeast rice extract, omega-3, and salmon oil to help lower her cholesterol levels.  Overall, she says she eats whatever she wants and does not cook at home.  She eats a lot of processed and high fatty foods.  She also says that she does not exercise.  She is not overweight but her diet is not consistent with a low-fat and low-cholesterol diet so we talked about that in more detail.    In regards to her osteoporosis history, there was a bone density scan which was done last year which showed osteopenia.  She is on alendronate.  We talked about getting a repeat scan next year.  There is been no history of falls or fractures.  She does take calcium and vitamin D on a regular basis.  Does take a multivitamin.  She endorses no side effects from the medication and is doing well.  For her hypothyroidism, she is on levothyroxine.  Doing well on this medication and the recent studies showed normal thyroid levels.  Her vitamin D levels have also been normal.  She does not have any acute complaints today.      No problem-specific Assessment & Plan notes found for this encounter.      She  has a past medical history of Hip pain (7/11/2016); Low back pain (7/11/2016); and Ovarian cyst (7/11/2016). She also has no past medical history of  "Breast cancer (HCC).    Patient Active Problem List    Diagnosis Date Noted   • Lateral epicondylitis of right elbow 04/18/2017   • Colon polyp 04/18/2017   • Nephrolithiasis 04/17/2017   • Healthcare maintenance 04/17/2017   • Acquired hypothyroidism 07/08/2016   • Dyslipidemia 07/08/2016   • Osteoporosis 07/08/2016       Allergies:Patient has no known allergies.    Current Outpatient Prescriptions   Medication Sig Dispense Refill   • atorvastatin (LIPITOR) 20 MG Tab Take 1 Tab by mouth every day. 30 Tab 3   • Omega-3 Fatty Acids (SALMON OIL-1000 PO) Take  by mouth.     • Ginkgo Biloba 120 MG Cap Take  by mouth.     • levothyroxine (SYNTHROID) 50 MCG Tab Take 1 Tab by mouth every day. 90 Tab 3   • alendronate (FOSAMAX) 70 MG Tab Take 1 Tab by mouth every 7 days. 12 Tab 3   • Magnesium 400 MG Cap Take  by mouth.     • Multiple Vitamins-Minerals (CENTRUM PO) Take  by mouth.     • Calcium Citrate (CITRACAL PO) Take  by mouth.     • Omega-3 Fatty Acids (FISH OIL PO) Take  by mouth.     • Cholecalciferol (VITAMIN D-3 PO) Take 2,000 Units by mouth every day.     • Coenzyme Q10 (COQ10 PO) Take  by mouth.     • Multiple Vitamins-Minerals (PRESERVISION AREDS 2 PO) Take  by mouth.       No current facility-administered medications for this visit.        Social History   Substance Use Topics   • Smoking status: Never Smoker   • Smokeless tobacco: Never Used   • Alcohol use No       Family History   Problem Relation Age of Onset   • Lung Cancer Mother    • Hypertension Father    • Stroke Unknown         GRANDFATHER   • Breast Cancer Sister      Review of Systems:   Pertinent positives as stated in HPI, all others reviewed as negative.    Physical Exam:  /75 (BP Location: Left arm, Patient Position: Sitting, BP Cuff Size: Adult)   Pulse 83   Temp 37.2 °C (98.9 °F) (Temporal)   Ht 1.581 m (5' 2.25\")   Wt 54.9 kg (121 lb)   SpO2 96%  Body mass index is 21.95 kg/m².    General Appearance: healthy, alert, no distress, " cooperative  Skin: No rashes or lesions.  Head: Normocephalic. No masses appreciated.   Eyes: PERRLA.  Oropharynx: Oropharynx moist and without lesion.  Lungs: Lungs clear to auscultation bilaterally.  Heart: RRR without murmur, gallop, or rubs.  Abdomen: Soft, non-tender. BS normal.   Musculoskeletal: Extremities: Upper and lower extremities appear normal. No deformities, edema.   Psychiatric: Mood appears normal.     Assessment/Plan:     1. Healthcare maintenance  Patient is up-to-date on colonoscopy.  She had it done last year in 2018 and it showed a hyperplastic polyp.  She was recommended to come back in 5 years which would be 2023.  She had a mammogram done last year.  Needs another mammogram so that was ordered at today's visit.  She recently had labs done which were reviewed with the patient today.  Her last Pap smear was in 2017.  She says she has not been sexually active for a very long time.  Any who, she will need a Pap smear in 2022 and that should be her last Pap smear unless she has any other concerns.  She sees a dentist as well as an eye doctor on a regular basis.  She is up-to-date on immunizations.  No other needs.    2. Acquired hypothyroidism  Recent thyroid studies were normal.  Continue on the current dose of levothyroxine.    3. Dyslipidemia  We will start her on atorvastatin 20 mg.  We check lipid profile as well as CMP in 6 months.  We discussed diet and exercise as well.  Her 10-year cardiovascular risk is 3.84% but her LDL is 190.  - atorvastatin (LIPITOR) 20 MG Tab; Take 1 Tab by mouth every day.  Dispense: 30 Tab; Refill: 3  - Lipid Profile; Future  - Comp Metabolic Panel; Future    4. Osteoporosis, unspecified osteoporosis type, unspecified pathological fracture presence  She is on alendronate.  Her last DEXA scan from 2018 showed osteopenia.  We will repeat another one next year and determine the need for continuation of medication.  She does take calcium and vitamin D on a regular  basis.  No acute complaints, no fractures or falls recently.    5. Screening for breast cancer  - MA-SCREENING MAMMO BILAT W/TOMOSYNTHESIS W/CAD; Future      Followup: Return in about 6 months (around 11/15/2019), or if symptoms worsen or fail to improve.    This note was created using voice recognition software. There may be unintended errors spelling, and grammar or content.

## 2019-05-29 ENCOUNTER — HOSPITAL ENCOUNTER (OUTPATIENT)
Dept: RADIOLOGY | Facility: MEDICAL CENTER | Age: 64
End: 2019-05-29
Attending: INTERNAL MEDICINE
Payer: COMMERCIAL

## 2019-05-29 DIAGNOSIS — Z12.39 SCREENING FOR BREAST CANCER: ICD-10-CM

## 2019-05-29 PROCEDURE — 77067 SCR MAMMO BI INCL CAD: CPT

## 2020-12-14 ENCOUNTER — HOSPITAL ENCOUNTER (OUTPATIENT)
Dept: HOSPITAL 8 - CFH | Age: 65
Discharge: HOME | End: 2020-12-14
Payer: COMMERCIAL

## 2020-12-14 DIAGNOSIS — Z12.31: Primary | ICD-10-CM

## 2020-12-14 PROCEDURE — 77067 SCR MAMMO BI INCL CAD: CPT

## 2021-10-28 ENCOUNTER — OFFICE VISIT (OUTPATIENT)
Dept: MEDICAL GROUP | Facility: CLINIC | Age: 66
End: 2021-10-28
Payer: COMMERCIAL

## 2021-10-28 VITALS
HEART RATE: 76 BPM | SYSTOLIC BLOOD PRESSURE: 156 MMHG | OXYGEN SATURATION: 98 % | HEIGHT: 63 IN | BODY MASS INDEX: 22.32 KG/M2 | WEIGHT: 126 LBS | RESPIRATION RATE: 12 BRPM | TEMPERATURE: 97.7 F | DIASTOLIC BLOOD PRESSURE: 88 MMHG

## 2021-10-28 DIAGNOSIS — R04.0 BLEEDING NOSE: ICD-10-CM

## 2021-10-28 DIAGNOSIS — E03.9 HYPOTHYROIDISM, UNSPECIFIED TYPE: Chronic | ICD-10-CM

## 2021-10-28 DIAGNOSIS — E78.5 HYPERLIPIDEMIA, UNSPECIFIED HYPERLIPIDEMIA TYPE: ICD-10-CM

## 2021-10-28 PROCEDURE — 99214 OFFICE O/P EST MOD 30 MIN: CPT | Performed by: FAMILY MEDICINE

## 2021-10-28 RX ORDER — ASCORBIC ACID 1000 MG
TABLET ORAL
COMMUNITY
End: 2022-04-25

## 2021-10-28 RX ORDER — LEVOTHYROXINE SODIUM 0.05 MG/1
TABLET ORAL
COMMUNITY
End: 2022-08-12 | Stop reason: SDUPTHER

## 2021-10-28 NOTE — ASSESSMENT & PLAN NOTE
Offered ENT referral when it may be convenient.   Ponaris nasal emollient discussed.   Nasal saline spray may be helpful.

## 2021-10-28 NOTE — PATIENT INSTRUCTIONS
Try a nasal lubricant such as Ponaris nose drops for moisture.  Can try a cool mist humidifier in bedroom for more moisture in the air.     Recheck  6 months    Can refer you to Ear Nose Throat doctor if nose bleeding comes back. .

## 2021-10-28 NOTE — PROGRESS NOTES
[facility]IC[facility]     PATIENT ID:  NAME:  Jasmin Gates  MRN:               [id number]  YOB: 1955    Date: 10:20 AM      CC:  Follow up  On recent labs, recurrentbloody nose    HPI: Jasmin Gates is a 66 y.o. female who presented with     1. Bloody nose --- has recurrent nasal bleeding.  She thought it may be due to her daily statin therapy.  She stopped her Atorvastatin and her nose bleeds seem to stop.  Wants to wait till she gets on Medicare before she would see an ENT specialist.     2. Hyperlipidemia --- she has stopped her statin per above reasons.  She wants to wait before starting something different.       3. Hypothyroid --- she takes her Levothyroxine 50 mcg daily.  No related thyroid symptoms reported.       REVIEW OF SYSTEMS:   Ten systems reviewed and were negative except as noted in the HPI.                PROBLEM LIST  Patient Active Problem List   Diagnosis   • Hypothyroidism   • Dyslipidemia   • Osteoporosis   • Hip pain   • Low back pain   • Ovarian cyst   • Nephrolithiasis   • Encounter for general adult medical examination without abnormal findings   • Lateral epicondylitis of right elbow   • Colon polyp   • Hyperlipidemia   • Encounter for screening for malignant neoplasm of breast   • Bleeding nose        PAST SURGICAL HISTORY:  Past Surgical History:   Procedure Laterality Date   • APPENDECTOMY         FAMILY HISTORY:  Family History   Problem Relation Age of Onset   • Lung Cancer Mother    • Hypertension Father    • Stroke Unknown         GRANDFATHER   • Breast Cancer Sister        SOCIAL HISTORY:   Social History     Tobacco Use   • Smoking status: Never Smoker   • Smokeless tobacco: Never Used   Substance Use Topics   • Alcohol use: No     Alcohol/week: 0.0 oz       ALLERGIES:  No Known Allergies    OUTPATIENT MEDICATIONS:    Current Outpatient Medications:   •  Magnesium 400 MG Cap, Take  by mouth., Disp: , Rfl:   •  Multiple Vitamins-Minerals (CENTRUM PO), Take  by mouth.,  "Disp: , Rfl:   •  Calcium Citrate (CITRACAL PO), Take  by mouth., Disp: , Rfl:   •  Omega-3 Fatty Acids (FISH OIL PO), Take  by mouth., Disp: , Rfl:   •  Cholecalciferol (VITAMIN D-3 PO), Take 2,000 Units by mouth every day., Disp: , Rfl:   •  levothyroxine (SYNTHROID) 50 MCG Tab, SYNTHROID 50 MCG TABS, Disp: , Rfl:   •  Coenzyme Q10 (CO Q 10) 10 MG Cap, CO Q 10 10 MG CAPS, Disp: , Rfl:   •  atorvastatin (LIPITOR) 20 MG Tab, Take 1 Tab by mouth every day. (Patient not taking: Reported on 10/28/2021), Disp: 30 Tab, Rfl: 3  •  Omega-3 Fatty Acids (SALMON OIL-1000 PO), Take  by mouth. (Patient not taking: Reported on 10/28/2021), Disp: , Rfl:   •  Ginkgo Biloba 120 MG Cap, Take  by mouth., Disp: , Rfl:   •  alendronate (FOSAMAX) 70 MG Tab, Take 1 Tab by mouth every 7 days. (Patient not taking: Reported on 10/28/2021), Disp: 12 Tab, Rfl: 3  •  Multiple Vitamins-Minerals (PRESERVISION AREDS 2 PO), Take  by mouth. (Patient not taking: Reported on 10/28/2021), Disp: , Rfl:     PHYSICAL EXAM:  Vitals:    10/28/21 0947   BP: 156/88   BP Location: Left arm   Patient Position: Sitting   BP Cuff Size: Adult   Pulse: 76   Resp: 12   Temp: 36.5 °C (97.7 °F)   TempSrc: Tympanic   SpO2: 98%   Weight: 57.2 kg (126 lb)   Height: 1.588 m (5' 2.5\")       General: Pt resting in NAD, cooperative   Skin:  Pink, warm and dry.  Extremities:  Full range of motion.  CNS:  Muscle tone is normal. No gross focal neurologic deficits  Psychiatric: mood and affect are normal.      ASSESSMENT/PLAN:   66 y.o. female     Problem List Items Addressed This Visit     Bleeding nose     Offered ENT referral when it may be convenient.   Ponaris nasal emollient discussed.   Nasal saline spray may be helpful.          Hyperlipidemia     Repeat labs one year or as needed.   Discussed option of trying other statins.          Hypothyroidism     Continue daily thyroid medication.    May refill this maintenance medication for one year as needed.             " Relevant Medications    levothyroxine (SYNTHROID) 50 MCG Tab          Leandro Dean MD  [facility]

## 2021-10-28 NOTE — ASSESSMENT & PLAN NOTE
Continue daily thyroid medication.    May refill this maintenance medication for one year as needed.

## 2021-12-16 ENCOUNTER — TELEPHONE (OUTPATIENT)
Dept: HEALTH INFORMATION MANAGEMENT | Facility: OTHER | Age: 66
End: 2021-12-16

## 2022-04-25 ENCOUNTER — OFFICE VISIT (OUTPATIENT)
Dept: MEDICAL GROUP | Facility: CLINIC | Age: 67
End: 2022-04-25
Payer: MEDICARE

## 2022-04-25 VITALS
OXYGEN SATURATION: 97 % | SYSTOLIC BLOOD PRESSURE: 145 MMHG | BODY MASS INDEX: 22.15 KG/M2 | TEMPERATURE: 97.7 F | RESPIRATION RATE: 16 BRPM | HEART RATE: 70 BPM | HEIGHT: 63 IN | WEIGHT: 125 LBS | DIASTOLIC BLOOD PRESSURE: 81 MMHG

## 2022-04-25 DIAGNOSIS — Z12.31 ENCOUNTER FOR SCREENING MAMMOGRAM FOR MALIGNANT NEOPLASM OF BREAST: ICD-10-CM

## 2022-04-25 DIAGNOSIS — R03.0 ELEVATED BLOOD PRESSURE READING WITHOUT DIAGNOSIS OF HYPERTENSION: ICD-10-CM

## 2022-04-25 DIAGNOSIS — M81.0 OSTEOPOROSIS, UNSPECIFIED OSTEOPOROSIS TYPE, UNSPECIFIED PATHOLOGICAL FRACTURE PRESENCE: ICD-10-CM

## 2022-04-25 DIAGNOSIS — Z13.1 SCREENING FOR DIABETES MELLITUS: ICD-10-CM

## 2022-04-25 DIAGNOSIS — E78.5 DYSLIPIDEMIA: ICD-10-CM

## 2022-04-25 PROCEDURE — 99214 OFFICE O/P EST MOD 30 MIN: CPT | Performed by: FAMILY MEDICINE

## 2022-04-25 ASSESSMENT — PATIENT HEALTH QUESTIONNAIRE - PHQ9: CLINICAL INTERPRETATION OF PHQ2 SCORE: 0

## 2022-04-25 NOTE — PROGRESS NOTES
[facility] MEDICINE     PATIENT ID:  NAME:  Jasmin Gates  MRN:               [id number]  YOB: 1955    Date: 10:05 AM      CC:  Needs labs, mammogram and DEXA scan      HPI: Jasmin Gates is a 67 y.o. female who presented with multiple concerns today.     Problem   Elevated Blood Pressure Reading Without Diagnosis of Hypertension    /81 today.  Her father has hypertension.      Dyslipidemia    4/2017 cardiovascular risk score 2.2%.  She has stopped her statin medication and feels better and has had no bloody noses.     Would like labs ordered.      Osteoporosis    DEXA June 2015 T score of -2.2 and -1.8, FRAX score 5.1% major fracture 1.1% hip fracture-on Fosamax     Never got her last DEXA scan I had ordered a year ago.          REVIEW OF SYSTEMS:   Ten systems reviewed and were negative except as noted in the HPI.                PROBLEM LIST  Patient Active Problem List   Diagnosis   • Hypothyroidism   • Dyslipidemia   • Osteoporosis   • Hip pain   • Low back pain   • Ovarian cyst   • Nephrolithiasis   • Encounter for general adult medical examination without abnormal findings   • Lateral epicondylitis of right elbow   • Colon polyp   • Encounter for screening for malignant neoplasm of breast   • Bleeding nose   • BMI 23.0-23.9, adult   • Elevated blood pressure reading without diagnosis of hypertension        PAST SURGICAL HISTORY:  Past Surgical History:   Procedure Laterality Date   • APPENDECTOMY         FAMILY HISTORY:  Family History   Problem Relation Age of Onset   • Lung Cancer Mother    • Hypertension Father    • Stroke Unknown         GRANDFATHER   • Breast Cancer Sister        SOCIAL HISTORY:   Social History     Tobacco Use   • Smoking status: Never Smoker   • Smokeless tobacco: Never Used   Substance Use Topics   • Alcohol use: No     Alcohol/week: 0.0 oz       ALLERGIES:  No Known Allergies    OUTPATIENT MEDICATIONS:    Current Outpatient Medications:   •  TURMERIC PO, Take 1,500 mg  "by mouth every day. 2 tabs po qd, Disp: , Rfl:   •  Multiple Vitamins-Minerals (PRESERVISION AREDS 2+MULTI VIT PO), Take  by mouth every day., Disp: , Rfl:   •  COLLAGEN PO, Take 6,000 mg by mouth every day at 6 PM., Disp: , Rfl:   •  Plant Sterols and Stanols (CHOLESTOFF PO), Take  by mouth every day at 6 PM., Disp: , Rfl:   •  levothyroxine (SYNTHROID) 50 MCG Tab, SYNTHROID 50 MCG TABS, Disp: , Rfl:   •  Magnesium 400 MG Cap, Take  by mouth., Disp: , Rfl:   •  Multiple Vitamins-Minerals (CENTRUM PO), Take  by mouth., Disp: , Rfl:   •  Calcium Citrate (CITRACAL PO), Take  by mouth., Disp: , Rfl:   •  Omega-3 Fatty Acids (FISH OIL PO), Take  by mouth., Disp: , Rfl:   •  Cholecalciferol (VITAMIN D-3 PO), Take 2,000 Units by mouth every day., Disp: , Rfl:   •  atorvastatin (LIPITOR) 20 MG Tab, Take 1 Tab by mouth every day. (Patient not taking: No sig reported), Disp: 30 Tab, Rfl: 3    PHYSICAL EXAM:  Vitals:    04/25/22 0857   BP: 145/81   BP Location: Left arm   Patient Position: Sitting   BP Cuff Size: Adult   Pulse: 70   Resp: 16   Temp: 36.5 °C (97.7 °F)   TempSrc: Temporal   SpO2: 97%   Weight: 56.7 kg (125 lb)   Height: 1.588 m (5' 2.5\")       General: Pt resting in NAD, cooperative   Skin:  Pink, warm and dry.  HEENT: NC/AT. EOMI.  Lungs:  Symmetrical.  CTAB, good air movement   Cardiovascular:  S1/S2 RRR   Extremities:  Full range of motion.  CNS:  Muscle tone is normal. No gross focal neurologic deficits      ASSESSMENT/PLAN:   67 y.o. female     Problem List Items Addressed This Visit     Dyslipidemia     Discussed this is fine as her cardiovascular risk is low.   Will repeat a lipid panel at her request.          Relevant Orders    Lipid Profile    Elevated blood pressure reading without diagnosis of hypertension     Monitor her blood pressure.           Relevant Orders    BASIC METABOLIC PANEL    Encounter for screening for malignant neoplasm of breast    Relevant Orders    MA-SCREENING MAMMO BILAT W/CAD "    Osteoporosis     Ordered DEXA scan.          Relevant Orders    DS-BONE DENSITY STUDY (DEXA)      Other Visit Diagnoses     Screening for diabetes mellitus              Leandro Dean MD  [facility]

## 2022-04-25 NOTE — PATIENT INSTRUCTIONS
Monitor your blood pressure.  Normal is 120/70; your blood pressure is 145/81 today.      Labs ordered.     Mammogram ordered.

## 2022-04-25 NOTE — PROGRESS NOTES
[facility] MEDICINE     PATIENT ID:  NAME:  Jasmin Gtaes  MRN:               [id number]  YOB: 1955    Date: 9:56 AM      CC:  Needs labs, mammogram      HPI: Jasmin Gates is a 67 y.o. female who presented with     Problem   Elevated Blood Pressure Reading Without Diagnosis of Hypertension    /81 today.  Her father has hypertension.      Dyslipidemia    4/2017 cardiovascular risk score 2.2%.  She has stopped her statin medication and feels better and has had no bloody noses.     Would like labs ordered.          REVIEW OF SYSTEMS:   Ten systems reviewed and were negative except as noted in the HPI.                PROBLEM LIST  Patient Active Problem List   Diagnosis   • Hypothyroidism   • Dyslipidemia   • Osteoporosis   • Hip pain   • Low back pain   • Ovarian cyst   • Nephrolithiasis   • Encounter for general adult medical examination without abnormal findings   • Lateral epicondylitis of right elbow   • Colon polyp   • Encounter for screening for malignant neoplasm of breast   • Bleeding nose   • BMI 23.0-23.9, adult   • Elevated blood pressure reading without diagnosis of hypertension        PAST SURGICAL HISTORY:  Past Surgical History:   Procedure Laterality Date   • APPENDECTOMY         FAMILY HISTORY:  Family History   Problem Relation Age of Onset   • Lung Cancer Mother    • Hypertension Father    • Stroke Unknown         GRANDFATHER   • Breast Cancer Sister        SOCIAL HISTORY:   Social History     Tobacco Use   • Smoking status: Never Smoker   • Smokeless tobacco: Never Used   Substance Use Topics   • Alcohol use: No     Alcohol/week: 0.0 oz       ALLERGIES:  No Known Allergies    OUTPATIENT MEDICATIONS:    Current Outpatient Medications:   •  TURMERIC PO, Take 1,500 mg by mouth every day. 2 tabs po qd, Disp: , Rfl:   •  Multiple Vitamins-Minerals (PRESERVISION AREDS 2+MULTI VIT PO), Take  by mouth every day., Disp: , Rfl:   •  COLLAGEN PO, Take 6,000 mg by mouth every day at 6 PM.,  "Disp: , Rfl:   •  Plant Sterols and Stanols (CHOLESTOFF PO), Take  by mouth every day at 6 PM., Disp: , Rfl:   •  levothyroxine (SYNTHROID) 50 MCG Tab, SYNTHROID 50 MCG TABS, Disp: , Rfl:   •  Magnesium 400 MG Cap, Take  by mouth., Disp: , Rfl:   •  Multiple Vitamins-Minerals (CENTRUM PO), Take  by mouth., Disp: , Rfl:   •  Calcium Citrate (CITRACAL PO), Take  by mouth., Disp: , Rfl:   •  Omega-3 Fatty Acids (FISH OIL PO), Take  by mouth., Disp: , Rfl:   •  Cholecalciferol (VITAMIN D-3 PO), Take 2,000 Units by mouth every day., Disp: , Rfl:   •  atorvastatin (LIPITOR) 20 MG Tab, Take 1 Tab by mouth every day. (Patient not taking: No sig reported), Disp: 30 Tab, Rfl: 3    PHYSICAL EXAM:  Vitals:    04/25/22 0857   BP: 145/81   BP Location: Left arm   Patient Position: Sitting   BP Cuff Size: Adult   Pulse: 70   Resp: 16   Temp: 36.5 °C (97.7 °F)   TempSrc: Temporal   SpO2: 97%   Weight: 56.7 kg (125 lb)   Height: 1.588 m (5' 2.5\")       General: Pt resting in NAD, cooperative   Skin:  Pink, warm and dry.  HEENT: NC/AT. EOMI.  Lungs:  Symmetrical.  CTAB, good air movement   Cardiovascular:  S1/S2 RRR   Extremities:  Full range of motion.  CNS:  Muscle tone is normal. No gross focal neurologic deficits      ASSESSMENT/PLAN:   67 y.o. female     Problem List Items Addressed This Visit     Dyslipidemia     Discussed this is fine as her cardiovascular risk is low.   Will repeat a lipid panel at her request.          Relevant Orders    Lipid Profile    Elevated blood pressure reading without diagnosis of hypertension     Monitor her blood pressure.           Relevant Orders    BASIC METABOLIC PANEL    Encounter for screening for malignant neoplasm of breast    Relevant Orders    MA-SCREENING MAMMO BILAT W/CAD      Other Visit Diagnoses     Screening for diabetes mellitus              Leandro Dean MD  [facility]     "

## 2022-04-25 NOTE — ASSESSMENT & PLAN NOTE
Discussed this is fine as her cardiovascular risk is low.   Will repeat a lipid panel at her request.

## 2022-04-25 NOTE — ADDENDUM NOTE
Addended by: JOANNE GONGORA on: 4/25/2022 10:07 AM     Modules accepted: Orders, Level of Service

## 2022-04-26 ENCOUNTER — HOSPITAL ENCOUNTER (OUTPATIENT)
Dept: LAB | Facility: MEDICAL CENTER | Age: 67
End: 2022-04-26
Attending: FAMILY MEDICINE
Payer: MEDICARE

## 2022-04-26 DIAGNOSIS — E78.5 DYSLIPIDEMIA: ICD-10-CM

## 2022-04-26 LAB
ANION GAP SERPL CALC-SCNC: 8 MMOL/L (ref 7–16)
BUN SERPL-MCNC: 12 MG/DL (ref 8–22)
CALCIUM SERPL-MCNC: 9.5 MG/DL (ref 8.5–10.5)
CHLORIDE SERPL-SCNC: 102 MMOL/L (ref 96–112)
CHOLEST SERPL-MCNC: 269 MG/DL (ref 100–199)
CO2 SERPL-SCNC: 29 MMOL/L (ref 20–33)
CREAT SERPL-MCNC: 0.6 MG/DL (ref 0.5–1.4)
FASTING STATUS PATIENT QL REPORTED: NORMAL
GFR SERPLBLD CREATININE-BSD FMLA CKD-EPI: 99 ML/MIN/1.73 M 2
GLUCOSE SERPL-MCNC: 91 MG/DL (ref 65–99)
HDLC SERPL-MCNC: 56 MG/DL
LDLC SERPL CALC-MCNC: 174 MG/DL
POTASSIUM SERPL-SCNC: 4.3 MMOL/L (ref 3.6–5.5)
SODIUM SERPL-SCNC: 139 MMOL/L (ref 135–145)
TRIGL SERPL-MCNC: 195 MG/DL (ref 0–149)

## 2022-04-26 PROCEDURE — 80061 LIPID PANEL: CPT

## 2022-04-26 PROCEDURE — 80048 BASIC METABOLIC PNL TOTAL CA: CPT

## 2022-04-26 PROCEDURE — 36415 COLL VENOUS BLD VENIPUNCTURE: CPT

## 2022-05-19 ENCOUNTER — HOSPITAL ENCOUNTER (OUTPATIENT)
Dept: RADIOLOGY | Facility: MEDICAL CENTER | Age: 67
End: 2022-05-19
Attending: FAMILY MEDICINE
Payer: MEDICARE

## 2022-05-19 DIAGNOSIS — Z12.31 ENCOUNTER FOR SCREENING MAMMOGRAM FOR MALIGNANT NEOPLASM OF BREAST: ICD-10-CM

## 2022-05-19 DIAGNOSIS — M81.0 OSTEOPOROSIS, UNSPECIFIED OSTEOPOROSIS TYPE, UNSPECIFIED PATHOLOGICAL FRACTURE PRESENCE: ICD-10-CM

## 2022-05-19 PROCEDURE — 77080 DXA BONE DENSITY AXIAL: CPT

## 2022-05-19 PROCEDURE — 77063 BREAST TOMOSYNTHESIS BI: CPT

## 2022-05-20 ENCOUNTER — HOSPITAL ENCOUNTER (OUTPATIENT)
Dept: RADIOLOGY | Facility: MEDICAL CENTER | Age: 67
End: 2022-05-20
Payer: MEDICARE

## 2022-07-19 ENCOUNTER — TELEPHONE (OUTPATIENT)
Dept: HEALTH INFORMATION MANAGEMENT | Facility: OTHER | Age: 67
End: 2022-07-19
Payer: MEDICARE

## 2022-07-19 NOTE — TELEPHONE ENCOUNTER
I called pt's father to schedule an appt for him, to establish care with a [facility] PCP, because he has [facility], but, a [facility] PCP.  So, Jasmin scheduled an appt w/ a [facility] PCP, because, she has the same SCP plan.

## 2022-08-12 RX ORDER — LEVOTHYROXINE SODIUM 0.05 MG/1
TABLET ORAL
Qty: 100 TABLET | Refills: 3 | Status: SHIPPED | OUTPATIENT
Start: 2022-08-12 | End: 2023-09-26

## 2022-08-29 ENCOUNTER — OFFICE VISIT (OUTPATIENT)
Dept: MEDICAL GROUP | Facility: PHYSICIAN GROUP | Age: 67
End: 2022-08-29
Payer: MEDICARE

## 2022-08-29 VITALS
HEART RATE: 69 BPM | OXYGEN SATURATION: 96 % | DIASTOLIC BLOOD PRESSURE: 76 MMHG | SYSTOLIC BLOOD PRESSURE: 120 MMHG | BODY MASS INDEX: 22.15 KG/M2 | WEIGHT: 125 LBS | TEMPERATURE: 98.4 F | HEIGHT: 63 IN

## 2022-08-29 DIAGNOSIS — E78.5 DYSLIPIDEMIA: ICD-10-CM

## 2022-08-29 DIAGNOSIS — E03.9 HYPOTHYROIDISM, UNSPECIFIED TYPE: ICD-10-CM

## 2022-08-29 PROCEDURE — 99214 OFFICE O/P EST MOD 30 MIN: CPT | Performed by: FAMILY MEDICINE

## 2022-08-29 NOTE — PROGRESS NOTES
Subjective:     Chief Complaint   Patient presents with    Establish Care    Results     From prior PCP       HPI:   Jasmin presents today to discuss the following.    Dyslipidemia  Chronic issue  Elevated  The 10-year ASCVD risk score ([place] APRIL Jr., et al., 2013) is: 5.7%      Hypothyroidism  Chronic issue  On synthroid    Past Medical History:   Diagnosis Date    Hip pain 2016    Hyperlipidemia 2014    Labs of 21 are reviewed---, total chol 255, triglycerides 251, HDL 49.  She has stopped her statin therapy due to her recurrent epistaxis.  Father is alive at age 91 and mother  in her 60s of lung cancer.     Low back pain 2016    Ovarian cyst 2016       Current Outpatient Medications Ordered in Epic   Medication Sig Dispense Refill    levothyroxine (SYNTHROID) 50 MCG Tab TAKE 11 TABLET BY MOUTH EVERY  Tablet 3    TURMERIC PO Take 1,500 mg by mouth every day. 2 tabs po qd      Multiple Vitamins-Minerals (PRESERVISION AREDS 2+MULTI VIT PO) Take  by mouth every day.      COLLAGEN PO Take 6,000 mg by mouth every day at 6 PM.      Plant Sterols and Stanols (CHOLESTOFF PO) Take  by mouth every day at 6 PM.      Magnesium 400 MG Cap Take  by mouth.      Multiple Vitamins-Minerals (CENTRUM PO) Take  by mouth.      Calcium Citrate (CITRACAL PO) Take  by mouth.      Omega-3 Fatty Acids (FISH OIL PO) Take  by mouth.      Cholecalciferol (VITAMIN D-3 PO) Take 2,000 Units by mouth every day.       No current [facility]-ordered facility-administered medications on file.       Allergies:  Patient has no known allergies.    Health Maintenance: Completed    ROS:  Gen: no fevers/chills, no changes in weight  Eyes: no changes in vision  Pulm: no sob, no cough  CV: no chest pain, no palpitations  GI: no nausea/vomiting, no diarrhea      Objective:     Exam:  /76 (BP Location: Left arm, Patient Position: Sitting, BP Cuff Size: Adult)   Pulse 69   Temp 36.9 °C (98.4 °F) (Temporal)   Ht 1.588  "m (5' 2.5\")   Wt 56.7 kg (125 lb)   SpO2 96%   BMI 22.50 kg/m²  Body mass index is 22.5 kg/m².    Constitutional: Alert, no distress, well-groomed.  Skin: Warm, dry, good turgor, no rashes in visible areas.  Eye: Equal, round and reactive, conjunctiva clear, lids normal.  ENMT: Lips without lesions, good dentition, moist mucous membranes.  Neck: Trachea midline, no masses, no thyromegaly.  Respiratory: Unlabored respiratory effort, no cough.  MSK: Normal gait, moves all extremities.  Neuro: Grossly non-focal.   Psych: Alert and oriented x3, normal affect and mood.        Assessment & Plan:     67 y.o. female with the following -     1. Dyslipidemia  Chronic condition.  Patient declines statin therapy at this time.  She would like to reinforce lifestyle change and repeat lipid panel in 6 months  - Lipid Profile; Future    2. Hypothyroidism, unspecified type  Chronic condition.  Continue with Synthroid.      Return in about 6 months (around 2/28/2023).          Please note that this dictation was created using voice recognition software. I have made every reasonable attempt to correct obvious errors, but I expect that there are errors of grammar and possibly content that I did not discover before finalizing the note.        "

## 2023-05-08 ENCOUNTER — HOSPITAL ENCOUNTER (OUTPATIENT)
Dept: LAB | Facility: MEDICAL CENTER | Age: 68
End: 2023-05-08
Attending: FAMILY MEDICINE
Payer: MEDICARE

## 2023-05-08 ENCOUNTER — RESEARCH ENCOUNTER (OUTPATIENT)
Dept: MEDICAL GROUP | Facility: PHYSICIAN GROUP | Age: 68
End: 2023-05-08
Payer: MEDICARE

## 2023-05-08 ENCOUNTER — OFFICE VISIT (OUTPATIENT)
Dept: MEDICAL GROUP | Facility: PHYSICIAN GROUP | Age: 68
End: 2023-05-08
Payer: MEDICARE

## 2023-05-08 VITALS
RESPIRATION RATE: 16 BRPM | TEMPERATURE: 97.6 F | WEIGHT: 125.2 LBS | HEIGHT: 63 IN | DIASTOLIC BLOOD PRESSURE: 70 MMHG | HEART RATE: 68 BPM | SYSTOLIC BLOOD PRESSURE: 118 MMHG | BODY MASS INDEX: 22.18 KG/M2 | OXYGEN SATURATION: 98 %

## 2023-05-08 DIAGNOSIS — Z80.0 FAMILY HISTORY OF COLON CANCER: ICD-10-CM

## 2023-05-08 DIAGNOSIS — Z11.59 NEED FOR HEPATITIS C SCREENING TEST: ICD-10-CM

## 2023-05-08 DIAGNOSIS — K63.5 POLYP OF COLON, UNSPECIFIED PART OF COLON, UNSPECIFIED TYPE: ICD-10-CM

## 2023-05-08 DIAGNOSIS — Z12.31 ENCOUNTER FOR SCREENING MAMMOGRAM FOR MALIGNANT NEOPLASM OF BREAST: ICD-10-CM

## 2023-05-08 DIAGNOSIS — J02.9 SORE THROAT: ICD-10-CM

## 2023-05-08 DIAGNOSIS — E03.9 ACQUIRED HYPOTHYROIDISM: ICD-10-CM

## 2023-05-08 DIAGNOSIS — Z00.00 ADULT GENERAL MEDICAL EXAM: ICD-10-CM

## 2023-05-08 DIAGNOSIS — E78.5 DYSLIPIDEMIA: ICD-10-CM

## 2023-05-08 DIAGNOSIS — Z71.83 ENCOUNTER FOR NONPROCREATIVE GENETIC COUNSELING: ICD-10-CM

## 2023-05-08 DIAGNOSIS — M85.88 OSTEOPENIA OF LUMBAR SPINE: ICD-10-CM

## 2023-05-08 DIAGNOSIS — Z00.6 RESEARCH STUDY PATIENT: ICD-10-CM

## 2023-05-08 DIAGNOSIS — Z76.89 ENCOUNTER TO ESTABLISH CARE: ICD-10-CM

## 2023-05-08 PROBLEM — R03.0 ELEVATED BLOOD PRESSURE READING WITHOUT DIAGNOSIS OF HYPERTENSION: Status: RESOLVED | Noted: 2022-04-25 | Resolved: 2023-05-08

## 2023-05-08 PROBLEM — R04.0 BLEEDING NOSE: Status: RESOLVED | Noted: 2021-10-28 | Resolved: 2023-05-08

## 2023-05-08 PROBLEM — N20.0 CALCULUS OF KIDNEY: Status: RESOLVED | Noted: 2017-04-17 | Resolved: 2023-05-08

## 2023-05-08 PROBLEM — M77.11 LATERAL EPICONDYLITIS OF RIGHT ELBOW: Status: RESOLVED | Noted: 2017-04-18 | Resolved: 2023-05-08

## 2023-05-08 LAB
APPEARANCE UR: CLEAR
BASOPHILS # BLD AUTO: 0.8 % (ref 0–1.8)
BASOPHILS # BLD: 0.03 K/UL (ref 0–0.12)
BILIRUB UR QL STRIP.AUTO: NEGATIVE
COLOR UR: YELLOW
EOSINOPHIL # BLD AUTO: 0.02 K/UL (ref 0–0.51)
EOSINOPHIL NFR BLD: 0.5 % (ref 0–6.9)
ERYTHROCYTE [DISTWIDTH] IN BLOOD BY AUTOMATED COUNT: 44.5 FL (ref 35.9–50)
GLUCOSE UR STRIP.AUTO-MCNC: NEGATIVE MG/DL
HCT VFR BLD AUTO: 42.9 % (ref 37–47)
HGB BLD-MCNC: 14 G/DL (ref 12–16)
IMM GRANULOCYTES # BLD AUTO: 0 K/UL (ref 0–0.11)
IMM GRANULOCYTES NFR BLD AUTO: 0 % (ref 0–0.9)
KETONES UR STRIP.AUTO-MCNC: NEGATIVE MG/DL
LEUKOCYTE ESTERASE UR QL STRIP.AUTO: NEGATIVE
LYMPHOCYTES # BLD AUTO: 1.53 K/UL (ref 1–4.8)
LYMPHOCYTES NFR BLD: 39.6 % (ref 22–41)
MCH RBC QN AUTO: 30.7 PG (ref 27–33)
MCHC RBC AUTO-ENTMCNC: 32.6 G/DL (ref 33.6–35)
MCV RBC AUTO: 94.1 FL (ref 81.4–97.8)
MICRO URNS: NORMAL
MONOCYTES # BLD AUTO: 0.23 K/UL (ref 0–0.85)
MONOCYTES NFR BLD AUTO: 6 % (ref 0–13.4)
NEUTROPHILS # BLD AUTO: 2.05 K/UL (ref 2–7.15)
NEUTROPHILS NFR BLD: 53.1 % (ref 44–72)
NITRITE UR QL STRIP.AUTO: NEGATIVE
NRBC # BLD AUTO: 0 K/UL
NRBC BLD-RTO: 0 /100 WBC
PH UR STRIP.AUTO: 7 [PH] (ref 5–8)
PLATELET # BLD AUTO: 273 K/UL (ref 164–446)
PMV BLD AUTO: 10.7 FL (ref 9–12.9)
PROT UR QL STRIP: NEGATIVE MG/DL
RBC # BLD AUTO: 4.56 M/UL (ref 4.2–5.4)
RBC UR QL AUTO: NEGATIVE
SP GR UR STRIP.AUTO: 1.01
UROBILINOGEN UR STRIP.AUTO-MCNC: 0.2 MG/DL
WBC # BLD AUTO: 3.9 K/UL (ref 4.8–10.8)

## 2023-05-08 PROCEDURE — 84443 ASSAY THYROID STIM HORMONE: CPT

## 2023-05-08 PROCEDURE — 80053 COMPREHEN METABOLIC PANEL: CPT

## 2023-05-08 PROCEDURE — 99214 OFFICE O/P EST MOD 30 MIN: CPT | Performed by: FAMILY MEDICINE

## 2023-05-08 PROCEDURE — 80061 LIPID PANEL: CPT

## 2023-05-08 PROCEDURE — 36415 COLL VENOUS BLD VENIPUNCTURE: CPT

## 2023-05-08 PROCEDURE — G0472 HEP C SCREEN HIGH RISK/OTHER: HCPCS

## 2023-05-08 PROCEDURE — 81003 URINALYSIS AUTO W/O SCOPE: CPT

## 2023-05-08 PROCEDURE — 85025 COMPLETE CBC W/AUTO DIFF WBC: CPT

## 2023-05-08 RX ORDER — AMOXICILLIN 500 MG/1
CAPSULE ORAL
COMMUNITY
Start: 2023-04-06 | End: 2023-05-08

## 2023-05-08 RX ORDER — ASCORBIC ACID 1000 MG
TABLET ORAL
COMMUNITY
End: 2023-11-09

## 2023-05-08 ASSESSMENT — PATIENT HEALTH QUESTIONNAIRE - PHQ9
5. POOR APPETITE OR OVEREATING: 1 - SEVERAL DAYS
SUM OF ALL RESPONSES TO PHQ QUESTIONS 1-9: 8
CLINICAL INTERPRETATION OF PHQ2 SCORE: 3

## 2023-05-08 NOTE — ASSESSMENT & PLAN NOTE
This is a chronic problem.  Patient had some type of polyp removed about 5 years ago.  She is due for colonoscopy.  Referral made.

## 2023-05-08 NOTE — ASSESSMENT & PLAN NOTE
This is a chronic problem.  Her sister has stage IV colon cancer.  Patient has had previous colon polyps.  She is due for colonoscopy and that will be ordered.  Her last one was 5 years ago.

## 2023-05-08 NOTE — ASSESSMENT & PLAN NOTE
This is a chronic problem.  Patient does have a history of elevated cholesterol and LDL most likely due to genetic reasons.  At 1 point she was tried on atorvastatin but it caused her to have bloody noses so she discontinued it.  She does not want to take anything at the present time.

## 2023-05-08 NOTE — ASSESSMENT & PLAN NOTE
Patient is here to establish care and to have her annual medical exam done.  Her health history was reviewed.  Labs ordered.

## 2023-05-08 NOTE — PROGRESS NOTES
Subjective:     CC: Here to establish care and discuss her medical concerns:    HPI:   Jasmin presents today with the following medical issues:    Encounter to establish care  Patient is here today to talk about her health history and to establish care.  She does need lab work done.  And some screening lab test.    Family history of colon cancer  This is a chronic problem.  Her sister has stage IV colon cancer.  Patient has had previous colon polyps.  She is due for colonoscopy and that will be ordered.  Her last one was 5 years ago.    Hypothyroidism  This is a chronic problem.  Patient is due for labs.  She is on supplementation.    Osteopenia of lumbar spine  This is a chronic problem.  Sometime back in the early 2000's there is a history of her being diagnosed with osteoporosis.  She was on biphosphonate's for a while.  Her last DEXA scan have showed just osteopenia.    Encounter for nonprocreative genetic counseling  This is a chronic problem.  Her sister had a history of breast cancer.  I discussed the issue with the patient.  She is never had BRCA testing done .  We will refer her to the [facility] project.    Colon polyp  This is a chronic problem.  Patient had some type of polyp removed about 5 years ago.  She is due for colonoscopy.  Referral made.    Adult general medical exam  Patient is here to establish care and to have her annual medical exam done.  Her health history was reviewed.  Labs ordered.    Sore throat  This is a new problem.  Patient has been having sore throat on and off during the day but particularly in the afternoon.  Sometimes it causes her to have a slight cough.  No trouble swallowing.  Denies any nasal congestion.    Dyslipidemia  This is a chronic problem.  Patient does have a history of elevated cholesterol and LDL most likely due to genetic reasons.  At 1 point she was tried on atorvastatin but it caused her to have bloody noses so she discontinued it.  She does not want to  take anything at the present time.    Past Medical History:   Diagnosis Date    Hip pain 2016    Hyperlipidemia 2014    Labs of 21 are reviewed---, total chol 255, triglycerides 251, HDL 49.  She has stopped her statin therapy due to her recurrent epistaxis.  Father is alive at age 91 and mother  in her 60s of lung cancer.     Low back pain 2016    Ovarian cyst 2016       Social History     Tobacco Use    Smoking status: Never    Smokeless tobacco: Never   Vaping Use    Vaping Use: Never used   Substance Use Topics    Alcohol use: No     Alcohol/week: 0.0 oz    Drug use: No       Current Outpatient Medications Ordered in Epic   Medication Sig Dispense Refill    Coenzyme Q10 (CO Q 10) 10 MG Cap CO Q 10 10 MG CAPS      levothyroxine (SYNTHROID) 50 MCG Tab TAKE 11 TABLET BY MOUTH EVERY  Tablet 3    TURMERIC PO Take 1,500 mg by mouth every day. 2 tabs po qd      COLLAGEN PO Take 6,000 mg by mouth every day at 6 PM.      Plant Sterols and Stanols (CHOLESTOFF PO) Take  by mouth every day at 6 PM.      Magnesium 400 MG Cap Take  by mouth.      Multiple Vitamins-Minerals (CENTRUM PO) Take  by mouth.      Calcium Citrate (CITRACAL PO) Take  by mouth.      Omega-3 Fatty Acids (FISH OIL PO) Take  by mouth.      Cholecalciferol (VITAMIN D-3 PO) Take 2,000 Units by mouth every day.       No current [facility]-ordered facility-administered medications on file.       Allergies:  Patient has no known allergies.    Health Maintenance: Completed    ROS:  Gen: no fevers/chills, no changes in weight  Eyes: no changes in vision  ENT:  no hearing loss, no bloody nose  Pulm: no sob, no cough  CV: no chest pain, no palpitations  GI: no nausea/vomiting, no diarrhea  : no dysuria  MSk: no myalgias  Skin: no rash  Neuro: no headaches, no numbness/tingling  Heme/Lymph: no easy bruising      Objective:       Exam:  /70 (BP Location: Right arm, Patient Position: Sitting, BP Cuff Size: Adult)   Pulse  "68   Temp 36.4 °C (97.6 °F) (Temporal)   Resp 16   Ht 1.588 m (5' 2.5\")   Wt 56.8 kg (125 lb 3.2 oz)   SpO2 98%   BMI 22.53 kg/m²  Body mass index is 22.53 kg/m².    Gen: Alert and oriented, No apparent distress.  Eyes:   Extraocular motions intact.  No scleral icterus seen.  ENT:    Ear canals and TMs are clear.  Nose has slight congestion.  Posterior pharyngeal area slightly red without any exudate.  Neck: Neck is supple without lymphadenopathy.  Thyroid exam is normal.  Lungs: Normal effort, CTA bilaterally, no wheezes, rhonchi, or rales  CV: Regular rate and rhythm. No murmurs, rubs, or gallops.  No carotid bruits heard.  Abdomen: Soft, nontender, no organomegaly or masses.  Normal bowel sounds.  Ext: No clubbing, cyanosis, edema.  Neuro: Cranial nerves II through VIII are grossly intact.  No lateralized signs are seen.  Gait is normal.    Labs: Ordered    Assessment & Plan:     68 y.o. female with the following -     1. Encounter to establish care  Patient establish care with me today.  Health care history reviewed with her.    2. Acquired hypothyroidism  This is a chronic problem.  Lab ordered.  She will be notified of the results.  - TSH WITH REFLEX TO FT4; Future    3. Need for hepatitis C screening test  This is a screening issue.  Lab ordered.  - HCV Scrn ( [id number] [employer] - Medicare Patients Only); Future    4. Family history of colon cancer  This is a chronic problem.  Referral for colonoscopy made.  - Referral to GI for Colonoscopy    5. Polyp of colon, unspecified part of colon, unspecified type  This is a chronic problem.  Referral for colonoscopy made.  - Referral to GI for Colonoscopy    6. Adult general medical exam  Patient's medical exam today looked essentially normal.  Health care issues addressed.  Baseline labs ordered.  - Lipid Profile; Future  - Comp Metabolic Panel; Future  - TSH WITH REFLEX TO FT4; Future  - URINALYSIS,CULTURE IF INDICATED; Future  - CBC WITH DIFFERENTIAL; " Future  - ESTIMATED GFR; Future    7. Encounter for nonprocreative genetic counseling  This is a screening issue.  She has a family history of breast cancer.  Referral for the [employer] project made.  - Referral to Genetic Research Studies    8. Encounter for screening mammogram for malignant neoplasm of breast  This is a screening issue.  Mammogram ordered.  - MA-SCREENING MAMMO BILAT W/TOMOSYNTHESIS W/CAD; Future    9. Osteopenia of lumbar spine  This is a chronic problem.  Continue calcium and vitamin D supplementation.    10. Sore throat  This is a new problem.  Patient appears to have some postnasal drainage causing her symptoms.  I encouraged her to try something over-the-counter like Claritin or Allegra for now.  If she does not get better she is to let me know.    11. Dyslipidemia  This is a chronic problem.  Likely due to genetics.  We will continue to follow.  Encourage healthy diet.      Return in about 6 months (around 11/8/2023) for Long.    Please note that this dictation was created using voice recognition software. I have made every reasonable attempt to correct obvious errors, but I expect that there are errors of grammar and possibly content that I did not discover before finalizing the note.

## 2023-05-08 NOTE — ASSESSMENT & PLAN NOTE
This is a new problem.  Patient has been having sore throat on and off during the day but particularly in the afternoon.  Sometimes it causes her to have a slight cough.  No trouble swallowing.  Denies any nasal congestion.

## 2023-05-08 NOTE — ASSESSMENT & PLAN NOTE
Patient is here today to talk about her health history and to establish care.  She does need lab work done.  And some screening lab test.

## 2023-05-08 NOTE — ASSESSMENT & PLAN NOTE
This is a chronic problem.  Sometime back in the early 2000's there is a history of her being diagnosed with osteoporosis.  She was on biphosphonate's for a while.  Her last DEXA scan have showed just osteopenia.

## 2023-05-08 NOTE — ASSESSMENT & PLAN NOTE
This is a chronic problem.  Her sister had a history of breast cancer.  I discussed the issue with the patient.  She is never had BRCA testing done .  We will refer her to the [facility] project.

## 2023-05-09 LAB
ALBUMIN SERPL BCP-MCNC: 4.4 G/DL (ref 3.2–4.9)
ALBUMIN/GLOB SERPL: 1.5 G/DL
ALP SERPL-CCNC: 91 U/L (ref 30–99)
ALT SERPL-CCNC: 20 U/L (ref 2–50)
ANION GAP SERPL CALC-SCNC: 14 MMOL/L (ref 7–16)
AST SERPL-CCNC: 23 U/L (ref 12–45)
BILIRUB SERPL-MCNC: 0.6 MG/DL (ref 0.1–1.5)
BUN SERPL-MCNC: 11 MG/DL (ref 8–22)
CALCIUM ALBUM COR SERPL-MCNC: 8.9 MG/DL (ref 8.5–10.5)
CALCIUM SERPL-MCNC: 9.2 MG/DL (ref 8.5–10.5)
CHLORIDE SERPL-SCNC: 105 MMOL/L (ref 96–112)
CHOLEST SERPL-MCNC: 249 MG/DL (ref 100–199)
CO2 SERPL-SCNC: 23 MMOL/L (ref 20–33)
CREAT SERPL-MCNC: 0.55 MG/DL (ref 0.5–1.4)
FASTING STATUS PATIENT QL REPORTED: NORMAL
GFR SERPLBLD CREATININE-BSD FMLA CKD-EPI: 101 ML/MIN/1.73 M 2
GLOBULIN SER CALC-MCNC: 2.9 G/DL (ref 1.9–3.5)
GLUCOSE SERPL-MCNC: 88 MG/DL (ref 65–99)
HCV AB SER QL: NORMAL
HDLC SERPL-MCNC: 53 MG/DL
LDLC SERPL CALC-MCNC: 154 MG/DL
POTASSIUM SERPL-SCNC: 4.2 MMOL/L (ref 3.6–5.5)
PROT SERPL-MCNC: 7.3 G/DL (ref 6–8.2)
SODIUM SERPL-SCNC: 142 MMOL/L (ref 135–145)
TRIGL SERPL-MCNC: 208 MG/DL (ref 0–149)
TSH SERPL DL<=0.005 MIU/L-ACNC: 2.04 UIU/ML (ref 0.38–5.33)

## 2023-05-26 ENCOUNTER — PATIENT MESSAGE (OUTPATIENT)
Dept: HEALTH INFORMATION MANAGEMENT | Facility: OTHER | Age: 68
End: 2023-05-26

## 2023-05-26 ENCOUNTER — DOCUMENTATION (OUTPATIENT)
Dept: HEALTH INFORMATION MANAGEMENT | Facility: OTHER | Age: 68
End: 2023-05-26
Payer: MEDICARE

## 2023-06-08 LAB
APOB+LDLR+PCSK9 GENE MUT ANL BLD/T: NOT DETECTED
BRCA1+BRCA2 DEL+DUP + FULL MUT ANL BLD/T: NOT DETECTED
MLH1+MSH2+MSH6+PMS2 GN DEL+DUP+FUL M: NOT DETECTED

## 2023-06-20 ENCOUNTER — DOCUMENTATION (OUTPATIENT)
Dept: HEALTH INFORMATION MANAGEMENT | Facility: OTHER | Age: 68
End: 2023-06-20
Payer: MEDICARE

## 2023-07-06 ENCOUNTER — HOSPITAL ENCOUNTER (OUTPATIENT)
Dept: RADIOLOGY | Facility: MEDICAL CENTER | Age: 68
End: 2023-07-06
Attending: FAMILY MEDICINE
Payer: MEDICARE

## 2023-07-06 DIAGNOSIS — Z12.31 ENCOUNTER FOR SCREENING MAMMOGRAM FOR MALIGNANT NEOPLASM OF BREAST: ICD-10-CM

## 2023-07-06 PROCEDURE — 77063 BREAST TOMOSYNTHESIS BI: CPT

## 2023-09-26 RX ORDER — LEVOTHYROXINE SODIUM 0.05 MG/1
TABLET ORAL
Qty: 100 TABLET | Refills: 3 | Status: SHIPPED | OUTPATIENT
Start: 2023-09-26 | End: 2023-11-09 | Stop reason: SDUPTHER

## 2023-11-09 ENCOUNTER — OFFICE VISIT (OUTPATIENT)
Dept: MEDICAL GROUP | Facility: PHYSICIAN GROUP | Age: 68
End: 2023-11-09
Payer: MEDICARE

## 2023-11-09 VITALS
OXYGEN SATURATION: 97 % | BODY MASS INDEX: 22.68 KG/M2 | DIASTOLIC BLOOD PRESSURE: 66 MMHG | HEART RATE: 70 BPM | SYSTOLIC BLOOD PRESSURE: 118 MMHG | WEIGHT: 128 LBS | TEMPERATURE: 97.7 F | HEIGHT: 63 IN | RESPIRATION RATE: 18 BRPM

## 2023-11-09 DIAGNOSIS — Z80.0 FAMILY HISTORY OF COLON CANCER: ICD-10-CM

## 2023-11-09 DIAGNOSIS — E78.5 DYSLIPIDEMIA: ICD-10-CM

## 2023-11-09 DIAGNOSIS — E03.9 ACQUIRED HYPOTHYROIDISM: ICD-10-CM

## 2023-11-09 DIAGNOSIS — Z13.6 ENCOUNTER FOR SCREENING FOR CARDIOVASCULAR DISORDERS: ICD-10-CM

## 2023-11-09 DIAGNOSIS — K57.30 DIVERTICULOSIS OF COLON: ICD-10-CM

## 2023-11-09 PROBLEM — Z76.89 ENCOUNTER TO ESTABLISH CARE: Status: RESOLVED | Noted: 2023-05-08 | Resolved: 2023-11-09

## 2023-11-09 PROBLEM — Z00.00 ADULT GENERAL MEDICAL EXAM: Status: RESOLVED | Noted: 2023-05-08 | Resolved: 2023-11-09

## 2023-11-09 PROBLEM — Z71.83 ENCOUNTER FOR NONPROCREATIVE GENETIC COUNSELING: Status: RESOLVED | Noted: 2023-05-08 | Resolved: 2023-11-09

## 2023-11-09 PROBLEM — K63.5 COLON POLYP: Status: RESOLVED | Noted: 2017-04-18 | Resolved: 2023-11-09

## 2023-11-09 PROBLEM — J02.9 SORE THROAT: Status: RESOLVED | Noted: 2023-05-08 | Resolved: 2023-11-09

## 2023-11-09 PROCEDURE — 3078F DIAST BP <80 MM HG: CPT | Performed by: FAMILY MEDICINE

## 2023-11-09 PROCEDURE — 99214 OFFICE O/P EST MOD 30 MIN: CPT | Performed by: FAMILY MEDICINE

## 2023-11-09 PROCEDURE — 3074F SYST BP LT 130 MM HG: CPT | Performed by: FAMILY MEDICINE

## 2023-11-09 RX ORDER — LEVOTHYROXINE SODIUM 0.05 MG/1
TABLET ORAL
Qty: 90 TABLET | Refills: 3 | Status: SHIPPED | OUTPATIENT
Start: 2023-11-09

## 2023-11-09 ASSESSMENT — FIBROSIS 4 INDEX: FIB4 SCORE: 1.24

## 2023-11-09 NOTE — PROGRESS NOTES
Subjective:     CC: Follow-up on several issues.     HPI:   Jasmin presents today with The following medical concerns:    Hypothyroidism  This is a chronic problem.   Patient's lab is current.  She does have a prescription to her previous doctor and she would like it changed to me.  We will do that today.    Family history of colon cancer  This is a chronic problem.  Her sister did die of colon cancer.  Patient did have her colonoscopy and no polyps were seen.  They told her the next 1 would be in 10 years but I told her given her history it should be in 5 years.    Dyslipidemia  This is a chronic stable condition.  Patient is on a healthy diet.    Diverticulosis of colon  This is a chronic problem.  Noted on recent colonoscopy.  We will continue to follow.    Past Medical History:   Diagnosis Date    Hip pain 2016    Hyperlipidemia 2014    Labs of 21 are reviewed---, total chol 255, triglycerides 251, HDL 49.  She has stopped her statin therapy due to her recurrent epistaxis.  Father is alive at age 91 and mother  in her 60s of lung cancer.     Low back pain 2016    Ovarian cyst 2016       Social History     Tobacco Use    Smoking status: Never    Smokeless tobacco: Never   Vaping Use    Vaping Use: Never used   Substance Use Topics    Alcohol use: No     Alcohol/week: 0.0 oz    Drug use: No       Current Outpatient Medications Ordered in Epic   Medication Sig Dispense Refill    Multiple Vitamins-Minerals (PRESERVISION AREDS PO) Take  by mouth.      Ascorbic Acid (VITAMIN C PO) Take  by mouth.      levothyroxine (SYNTHROID) 50 MCG Tab TAKE 1 TABLET BY MOUTH EVERY DAY 90 Tablet 3    TURMERIC PO Take 1,500 mg by mouth every day. 2 tabs po qd      Magnesium 400 MG Cap Take  by mouth.      Multiple Vitamins-Minerals (CENTRUM PO) Take  by mouth.      Calcium Citrate (CITRACAL PO) Take  by mouth.      Omega-3 Fatty Acids (FISH OIL PO) Take  by mouth.      Cholecalciferol (VITAMIN D-3  "PO) Take 2,000 Units by mouth every day.       No current [facility]-ordered facility-administered medications on file.       Allergies:  Patient has no known allergies.    Health Maintenance: Completed    ROS:  Gen: no fevers/chills, no changes in weight  Eyes: no changes in vision  ENT: no sore throat, no hearing loss, no bloody nose  Pulm: no sob, no cough  CV: no chest pain, no palpitations  GI: no nausea/vomiting, no diarrhea  : no dysuria  MSk: no myalgias  Skin: no rash  Neuro: no headaches, no numbness/tingling  Heme/Lymph: no easy bruising      Objective:       Exam:  /66 (BP Location: Left arm, Patient Position: Sitting, BP Cuff Size: Adult)   Pulse 70   Temp 36.5 °C (97.7 °F) (Temporal)   Resp 18   Ht 1.588 m (5' 2.5\")   Wt 58.1 kg (128 lb)   SpO2 97%   BMI 23.04 kg/m²  Body mass index is 23.04 kg/m².    Gen: Alert and oriented, No apparent distress.  Neck: Neck is supple without lymphadenopathy.  Lungs: Normal effort, CTA bilaterally, no wheezes, rhonchi, or rales  CV: Regular rate and rhythm. No murmurs, rubs, or gallops.  Ext: No clubbing, cyanosis, edema.      Labs: Results reviewed with patient.    Assessment & Plan:     68 y.o. female with the following -     1. Acquired hypothyroidism  Chronic problem.  Continue on current medication.  Prescription renewed.  We will recheck her labs next year.    2. Dyslipidemia  This is a chronic problem.  Continue on healthy diet.    3. Diverticulosis of colon  This is a chronic condition.  Continue to follow.    4. Family history of colon cancer  This is a chronic problem.  Next colonoscopy should be in 5 years.      Return in about 6 months (around 5/9/2024) for Long, f/view labs.    Please note that this dictation was created using voice recognition software. I have made every reasonable attempt to correct obvious errors, but I expect that there are errors of grammar and possibly content that I did not discover before finalizing the note.        "

## 2024-04-24 ENCOUNTER — HOSPITAL ENCOUNTER (OUTPATIENT)
Dept: LAB | Facility: MEDICAL CENTER | Age: 69
End: 2024-04-24
Attending: FAMILY MEDICINE
Payer: MEDICARE

## 2024-04-24 DIAGNOSIS — E78.5 DYSLIPIDEMIA: ICD-10-CM

## 2024-04-24 DIAGNOSIS — E03.9 ACQUIRED HYPOTHYROIDISM: ICD-10-CM

## 2024-04-24 DIAGNOSIS — Z13.6 ENCOUNTER FOR SCREENING FOR CARDIOVASCULAR DISORDERS: ICD-10-CM

## 2024-04-24 LAB
ALBUMIN SERPL BCP-MCNC: 4.6 G/DL (ref 3.2–4.9)
ALBUMIN/GLOB SERPL: 1.9 G/DL
ALP SERPL-CCNC: 87 U/L (ref 30–99)
ALT SERPL-CCNC: 17 U/L (ref 2–50)
ANION GAP SERPL CALC-SCNC: 11 MMOL/L (ref 7–16)
APPEARANCE UR: CLEAR
AST SERPL-CCNC: 24 U/L (ref 12–45)
BASOPHILS # BLD AUTO: 1.2 % (ref 0–1.8)
BASOPHILS # BLD: 0.04 K/UL (ref 0–0.12)
BILIRUB SERPL-MCNC: 0.5 MG/DL (ref 0.1–1.5)
BILIRUB UR QL STRIP.AUTO: NEGATIVE
BUN SERPL-MCNC: 13 MG/DL (ref 8–22)
CALCIUM ALBUM COR SERPL-MCNC: 8.5 MG/DL (ref 8.5–10.5)
CALCIUM SERPL-MCNC: 9 MG/DL (ref 8.5–10.5)
CHLORIDE SERPL-SCNC: 109 MMOL/L (ref 96–112)
CHOLEST SERPL-MCNC: 261 MG/DL (ref 100–199)
CO2 SERPL-SCNC: 24 MMOL/L (ref 20–33)
COLOR UR: YELLOW
CREAT SERPL-MCNC: 0.65 MG/DL (ref 0.5–1.4)
EOSINOPHIL # BLD AUTO: 0.03 K/UL (ref 0–0.51)
EOSINOPHIL NFR BLD: 0.9 % (ref 0–6.9)
ERYTHROCYTE [DISTWIDTH] IN BLOOD BY AUTOMATED COUNT: 45.1 FL (ref 35.9–50)
GFR SERPLBLD CREATININE-BSD FMLA CKD-EPI: 96 ML/MIN/1.73 M 2
GLOBULIN SER CALC-MCNC: 2.4 G/DL (ref 1.9–3.5)
GLUCOSE SERPL-MCNC: 100 MG/DL (ref 65–99)
GLUCOSE UR STRIP.AUTO-MCNC: NEGATIVE MG/DL
HCT VFR BLD AUTO: 43.9 % (ref 37–47)
HDLC SERPL-MCNC: 56 MG/DL
HGB BLD-MCNC: 14.2 G/DL (ref 12–16)
IMM GRANULOCYTES # BLD AUTO: 0 K/UL (ref 0–0.11)
IMM GRANULOCYTES NFR BLD AUTO: 0 % (ref 0–0.9)
KETONES UR STRIP.AUTO-MCNC: NEGATIVE MG/DL
LDLC SERPL CALC-MCNC: 177 MG/DL
LEUKOCYTE ESTERASE UR QL STRIP.AUTO: NEGATIVE
LYMPHOCYTES # BLD AUTO: 1.4 K/UL (ref 1–4.8)
LYMPHOCYTES NFR BLD: 43.1 % (ref 22–41)
MCH RBC QN AUTO: 30.7 PG (ref 27–33)
MCHC RBC AUTO-ENTMCNC: 32.3 G/DL (ref 32.2–35.5)
MCV RBC AUTO: 95 FL (ref 81.4–97.8)
MICRO URNS: NORMAL
MONOCYTES # BLD AUTO: 0.3 K/UL (ref 0–0.85)
MONOCYTES NFR BLD AUTO: 9.2 % (ref 0–13.4)
NEUTROPHILS # BLD AUTO: 1.48 K/UL (ref 1.82–7.42)
NEUTROPHILS NFR BLD: 45.6 % (ref 44–72)
NITRITE UR QL STRIP.AUTO: NEGATIVE
NRBC # BLD AUTO: 0 K/UL
NRBC BLD-RTO: 0 /100 WBC (ref 0–0.2)
PH UR STRIP.AUTO: 6 [PH] (ref 5–8)
PLATELET # BLD AUTO: 243 K/UL (ref 164–446)
PMV BLD AUTO: 10.9 FL (ref 9–12.9)
POTASSIUM SERPL-SCNC: 4.3 MMOL/L (ref 3.6–5.5)
PROT SERPL-MCNC: 7 G/DL (ref 6–8.2)
PROT UR QL STRIP: NEGATIVE MG/DL
RBC # BLD AUTO: 4.62 M/UL (ref 4.2–5.4)
RBC UR QL AUTO: NEGATIVE
SODIUM SERPL-SCNC: 144 MMOL/L (ref 135–145)
SP GR UR STRIP.AUTO: 1.02
TRIGL SERPL-MCNC: 139 MG/DL (ref 0–149)
TSH SERPL DL<=0.005 MIU/L-ACNC: 1.28 UIU/ML (ref 0.38–5.33)
UROBILINOGEN UR STRIP.AUTO-MCNC: 0.2 MG/DL
WBC # BLD AUTO: 3.3 K/UL (ref 4.8–10.8)

## 2024-04-24 PROCEDURE — 80061 LIPID PANEL: CPT

## 2024-04-24 PROCEDURE — 36415 COLL VENOUS BLD VENIPUNCTURE: CPT

## 2024-04-24 PROCEDURE — 80053 COMPREHEN METABOLIC PANEL: CPT

## 2024-04-24 PROCEDURE — 81003 URINALYSIS AUTO W/O SCOPE: CPT

## 2024-04-24 PROCEDURE — 85025 COMPLETE CBC W/AUTO DIFF WBC: CPT

## 2024-04-24 PROCEDURE — 84443 ASSAY THYROID STIM HORMONE: CPT

## 2024-05-06 ENCOUNTER — DOCUMENTATION (OUTPATIENT)
Dept: HEALTH INFORMATION MANAGEMENT | Facility: OTHER | Age: 69
End: 2024-05-06
Payer: MEDICARE

## 2024-05-09 ENCOUNTER — OFFICE VISIT (OUTPATIENT)
Dept: MEDICAL GROUP | Facility: PHYSICIAN GROUP | Age: 69
End: 2024-05-09
Payer: MEDICARE

## 2024-05-09 VITALS
HEART RATE: 70 BPM | BODY MASS INDEX: 22.68 KG/M2 | RESPIRATION RATE: 18 BRPM | OXYGEN SATURATION: 99 % | SYSTOLIC BLOOD PRESSURE: 126 MMHG | HEIGHT: 63 IN | WEIGHT: 128 LBS | TEMPERATURE: 97.8 F | DIASTOLIC BLOOD PRESSURE: 68 MMHG

## 2024-05-09 DIAGNOSIS — D70.8 OTHER NEUTROPENIA (HCC): ICD-10-CM

## 2024-05-09 DIAGNOSIS — E78.5 DYSLIPIDEMIA: ICD-10-CM

## 2024-05-09 DIAGNOSIS — E03.9 ACQUIRED HYPOTHYROIDISM: ICD-10-CM

## 2024-05-09 PROCEDURE — 99213 OFFICE O/P EST LOW 20 MIN: CPT | Performed by: FAMILY MEDICINE

## 2024-05-09 PROCEDURE — 3074F SYST BP LT 130 MM HG: CPT | Performed by: FAMILY MEDICINE

## 2024-05-09 PROCEDURE — 3078F DIAST BP <80 MM HG: CPT | Performed by: FAMILY MEDICINE

## 2024-05-09 ASSESSMENT — PATIENT HEALTH QUESTIONNAIRE - PHQ9: CLINICAL INTERPRETATION OF PHQ2 SCORE: 0

## 2024-05-09 ASSESSMENT — FIBROSIS 4 INDEX: FIB4 SCORE: 1.6

## 2024-05-09 NOTE — PROGRESS NOTES
Subjective:     CC: Here for follow-up and to go over her lab test results.    HPI:   Jasmin presents today with the following medical concerns:    Dyslipidemia  This is a chronic problem.  Patient continues to have elevated cholesterol and LDL.  She had taken a statin in the past but it made her feel bad with bloody noses so she stopped it and never wants to restart.  She tries to have a healthy diet.  We did  talk about doing a calcium CT scoring test but she wants to think about that.    Hypothyroidism  This is a chronic stable condition.  TSH remains within normal limits.  She is on supplementation and was told she needs that for life.    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  Likely genetic.  Continue to follow.    Past Medical History:   Diagnosis Date    Hip pain 2016    Hyperlipidemia 2014    Labs of 21 are reviewed---, total chol 255, triglycerides 251, HDL 49.  She has stopped her statin therapy due to her recurrent epistaxis.  Father is alive at age 91 and mother  in her 60s of lung cancer.     Low back pain 2016    Ovarian cyst 2016       Social History     Tobacco Use    Smoking status: Never    Smokeless tobacco: Never   Vaping Use    Vaping Use: Never used   Substance Use Topics    Alcohol use: No     Alcohol/week: 0.0 oz    Drug use: No       Current Outpatient Medications Ordered in Epic   Medication Sig Dispense Refill    Multiple Vitamins-Minerals (PRESERVISION AREDS PO) Take  by mouth.      levothyroxine (SYNTHROID) 50 MCG Tab TAKE 1 TABLET BY MOUTH EVERY DAY 90 Tablet 3    TURMERIC PO Take 1,500 mg by mouth every day. 2 tabs po qd      Magnesium 400 MG Cap Take  by mouth.      Multiple Vitamins-Minerals (CENTRUM PO) Take  by mouth.      Calcium Citrate (CITRACAL PO) Take  by mouth.      Omega-3 Fatty Acids (FISH OIL PO) Take  by mouth.      Cholecalciferol (VITAMIN D-3 PO) Take 2,000 Units by mouth every day.       No current  "[facility]-ordered facility-administered medications on file.       Allergies:  Patient has no known allergies.    Health Maintenance: Completed    ROS:  Gen: no fevers/chills, no changes in weight  Eyes: no changes in vision  ENT: no sore throat, no hearing loss, no bloody nose  Pulm: no sob, no cough  CV: no chest pain, no palpitations  GI: no nausea/vomiting, no diarrhea  : no dysuria  MSk: no myalgias  Skin: no rash  Neuro: no headaches, no numbness/tingling  Heme/Lymph: no easy bruising      Objective:       Exam:  /68 (BP Location: Right arm, Patient Position: Sitting, BP Cuff Size: Adult)   Pulse 70   Temp 36.6 °C (97.8 °F) (Temporal)   Resp 18   Ht 1.6 m (5' 3\")   Wt 58.1 kg (128 lb)   SpO2 99%   BMI 22.67 kg/m²  Body mass index is 22.67 kg/m².    Gen: Alert and oriented, No apparent distress.  Eyes:   Extraocular motions intact.  No scleral icterus seen.  Ears:    Ear canals and TMs are clear.  Neck: Neck is supple without lymphadenopathy.  Lungs: Normal effort, CTA bilaterally, no wheezes, rhonchi, or rales  CV: Regular rate and rhythm. No murmurs, rubs, or gallops.  Ext: No clubbing, cyanosis, edema.  Neuro: Cranial nerves II through VIII are grossly intact.  No lateralized signs are seen.  Gait is normal.    Labs: Test results reviewed with patient    Assessment & Plan:     69 y.o. female with the following -     1. 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  Likely genetic.    2. Dyslipidemia  This is a chronic problem.  Continue healthy diet.  Patient declines a statin and declines a CT calcium scoring test.    3. Acquired hypothyroidism  This is a chronic stable condition.  Continue on supplement treatment.      Return in about 6 months (around 11/9/2024) for Long.    Please note that this dictation was created using voice recognition software. I have made every reasonable attempt to correct obvious errors, but I expect that there are errors of grammar and possibly content " that I did not discover before finalizing the note.

## 2024-05-09 NOTE — ASSESSMENT & PLAN NOTE
This is a chronic stable condition.  TSH remains within normal limits.  She is on supplementation and was told she needs that for life.

## 2024-05-09 NOTE — ASSESSMENT & PLAN NOTE
This is a chronic problem.  Patient continues to have elevated cholesterol and LDL.  She had taken a statin in the past but it made her feel bad with bloody noses so she stopped it and never wants to restart.  She tries to have a healthy diet.  We did  talk about doing a calcium CT scoring test but she wants to think about that.

## 2024-11-13 ENCOUNTER — OFFICE VISIT (OUTPATIENT)
Dept: MEDICAL GROUP | Facility: PHYSICIAN GROUP | Age: 69
End: 2024-11-13
Payer: MEDICARE

## 2024-11-13 VITALS
OXYGEN SATURATION: 98 % | DIASTOLIC BLOOD PRESSURE: 74 MMHG | RESPIRATION RATE: 16 BRPM | HEART RATE: 64 BPM | TEMPERATURE: 97.8 F | HEIGHT: 63 IN | SYSTOLIC BLOOD PRESSURE: 126 MMHG | WEIGHT: 132 LBS | BODY MASS INDEX: 23.39 KG/M2

## 2024-11-13 DIAGNOSIS — E78.5 DYSLIPIDEMIA: ICD-10-CM

## 2024-11-13 DIAGNOSIS — E03.9 ACQUIRED HYPOTHYROIDISM: ICD-10-CM

## 2024-11-13 PROBLEM — Z86.0100 HISTORY OF COLONIC POLYPS: Status: ACTIVE | Noted: 2024-11-13

## 2024-11-13 PROCEDURE — 3074F SYST BP LT 130 MM HG: CPT | Performed by: FAMILY MEDICINE

## 2024-11-13 PROCEDURE — 99214 OFFICE O/P EST MOD 30 MIN: CPT | Performed by: FAMILY MEDICINE

## 2024-11-13 PROCEDURE — 3078F DIAST BP <80 MM HG: CPT | Performed by: FAMILY MEDICINE

## 2024-11-13 RX ORDER — LEVOTHYROXINE SODIUM 50 UG/1
TABLET ORAL
Qty: 100 TABLET | Refills: 3 | Status: SHIPPED | OUTPATIENT
Start: 2024-11-13

## 2024-11-13 ASSESSMENT — FIBROSIS 4 INDEX: FIB4 SCORE: 1.6

## 2024-11-13 NOTE — ASSESSMENT & PLAN NOTE
This is a chronic problem.  We discussed at her last visit.  She is on a healthier diet and we will recheck her labs before her next visit in 6 months.

## 2024-11-13 NOTE — ASSESSMENT & PLAN NOTE
This is a chronic problem.  Patient is needing a refill on her medication today.  Her last TSH was within normal limits.

## 2024-11-13 NOTE — PROGRESS NOTES
Subjective:     CC: Patient is here today for her 6-month follow-up.  No particular concerns on today's visit.    HPI:   Jasmin presents today with the following medical issues:    Hypothyroidism  This is a chronic problem.  Patient is needing a refill on her medication today.  Her last TSH was within normal limits.    Dyslipidemia  This is a chronic problem.  We discussed at her last visit.  She is on a healthier diet and we will recheck her labs before her next visit in 6 months.    Past Medical History:   Diagnosis Date    Hip pain 2016    Hyperlipidemia 2014    Labs of 21 are reviewed---, total chol 255, triglycerides 251, HDL 49.  She has stopped her statin therapy due to her recurrent epistaxis.  Father is alive at age 91 and mother  in her 60s of lung cancer.     Low back pain 2016    Ovarian cyst 2016       Social History     Tobacco Use    Smoking status: Never    Smokeless tobacco: Never   Vaping Use    Vaping status: Never Used   Substance Use Topics    Alcohol use: No     Alcohol/week: 0.0 oz    Drug use: No       Current Outpatient Medications Ordered in Epic   Medication Sig Dispense Refill    levothyroxine (SYNTHROID) 50 MCG Tab TAKE 1 TABLET BY MOUTH EVERY  Tablet 3    Multiple Vitamins-Minerals (PRESERVISION AREDS PO) Take  by mouth.      TURMERIC PO Take 1,500 mg by mouth every day. 2 tabs po qd      Magnesium 400 MG Cap Take  by mouth.      Multiple Vitamins-Minerals (CENTRUM PO) Take  by mouth.      Calcium Citrate (CITRACAL PO) Take  by mouth.      Omega-3 Fatty Acids (FISH OIL PO) Take  by mouth.      Cholecalciferol (VITAMIN D-3 PO) Take 2,000 Units by mouth every day.       No current [facility]-ordered facility-administered medications on file.       Allergies:  Patient has no known allergies.    Health Maintenance: Completed    ROS:  Gen: no fevers/chills, no changes in weight  Eyes: no changes in vision  ENT: no sore throat, no hearing loss, no bloody  "nose  Pulm: no sob, no cough  CV: no chest pain, no palpitations  GI: no nausea/vomiting, no diarrhea  : no dysuria  MSk: no myalgias  Skin: no rash  Neuro: no headaches, no numbness/tingling  Heme/Lymph: no easy bruising      Objective:       Exam:  /74 (BP Location: Right arm, Patient Position: Sitting, BP Cuff Size: Adult)   Pulse 64   Temp 36.6 °C (97.8 °F) (Temporal)   Resp 16   Ht 1.6 m (5' 3\")   Wt 59.9 kg (132 lb)   SpO2 98%   BMI 23.38 kg/m²  Body mass index is 23.38 kg/m².    Gen: Alert and oriented, No apparent distress.  Neck: Neck is supple without lymphadenopathy.  Lungs: Normal effort, CTA bilaterally, no wheezes, rhonchi, or rales  CV: Regular rate and rhythm. No murmurs, rubs, or gallops.  Ext: No clubbing, cyanosis, edema.      Labs: Previous lab results reviewed    Assessment & Plan:     69 y.o. female with the following -     1. Acquired hypothyroidism  This is a chronic problem.  Prescription renewed.  Recheck TSH in 6 months.    2. Dyslipidemia  This is a chronic problem.  Continue on healthy diet.  Recheck labs in 6 months.      Return in about 6 months (around 5/13/2025) for Long, f/view labs.    Please note that this dictation was created using voice recognition software. I have made every reasonable attempt to correct obvious errors, but I expect that there are errors of grammar and possibly content that I did not discover before finalizing the note.        "

## 2025-03-24 ENCOUNTER — PATIENT MESSAGE (OUTPATIENT)
Dept: HEALTH INFORMATION MANAGEMENT | Facility: OTHER | Age: 70
End: 2025-03-24

## 2025-05-13 ENCOUNTER — OFFICE VISIT (OUTPATIENT)
Dept: MEDICAL GROUP | Facility: PHYSICIAN GROUP | Age: 70
End: 2025-05-13
Payer: MEDICARE

## 2025-05-13 VITALS
SYSTOLIC BLOOD PRESSURE: 128 MMHG | TEMPERATURE: 97.8 F | HEART RATE: 66 BPM | WEIGHT: 132 LBS | OXYGEN SATURATION: 98 % | HEIGHT: 63 IN | RESPIRATION RATE: 16 BRPM | BODY MASS INDEX: 23.39 KG/M2 | DIASTOLIC BLOOD PRESSURE: 70 MMHG

## 2025-05-13 DIAGNOSIS — Z12.31 ENCOUNTER FOR SCREENING MAMMOGRAM FOR MALIGNANT NEOPLASM OF BREAST: ICD-10-CM

## 2025-05-13 DIAGNOSIS — Z78.0 POSTMENOPAUSAL ESTROGEN DEFICIENCY: ICD-10-CM

## 2025-05-13 DIAGNOSIS — Z00.00 ADULT GENERAL MEDICAL EXAM: ICD-10-CM

## 2025-05-13 DIAGNOSIS — E03.9 ACQUIRED HYPOTHYROIDISM: ICD-10-CM

## 2025-05-13 DIAGNOSIS — L98.9 SKIN LESION OF FACE: ICD-10-CM

## 2025-05-13 DIAGNOSIS — M85.88 OSTEOPENIA OF LUMBAR SPINE: ICD-10-CM

## 2025-05-13 PROCEDURE — 3078F DIAST BP <80 MM HG: CPT | Performed by: FAMILY MEDICINE

## 2025-05-13 PROCEDURE — 3074F SYST BP LT 130 MM HG: CPT | Performed by: FAMILY MEDICINE

## 2025-05-13 PROCEDURE — 99214 OFFICE O/P EST MOD 30 MIN: CPT | Performed by: FAMILY MEDICINE

## 2025-05-13 ASSESSMENT — PATIENT HEALTH QUESTIONNAIRE - PHQ9: CLINICAL INTERPRETATION OF PHQ2 SCORE: 0

## 2025-05-13 ASSESSMENT — FIBROSIS 4 INDEX: FIB4 SCORE: 1.63

## 2025-05-13 NOTE — ASSESSMENT & PLAN NOTE
This is a new problem.  She states the lesion appeared to the right angle of her jaw just a month or 2 ago and has grown rapidly.  It has not bled.

## 2025-05-13 NOTE — ASSESSMENT & PLAN NOTE
This is a chronic issue.  Her last DEXA scan 3 years ago showed osteopenia.  Will go ahead and recheck another 1 at this point.  She was encouraged to take vitamin D and calcium supplementations.

## 2025-05-13 NOTE — PROGRESS NOTES
Subjective:     CC: Here for her exam and several issues.    HPI:   Jasmin presents today with the following medical concerns:    Adult general medical exam  Patient is here for her 6-month follow-up and also has several issues.  She is also due for labs and those will be ordered.  And she is due for her mammogram.    Hypothyroidism  This is a chronic problem.  Lab ordered for follow-up.  She is on supplementation.    Osteopenia of lumbar spine  This is a chronic issue.  Her last DEXA scan 3 years ago showed osteopenia.  Will go ahead and recheck another 1 at this point.  She was encouraged to take vitamin D and calcium supplementations.    Skin lesion of face  This is a new problem.  She states the lesion appeared to the right angle of her jaw just a month or 2 ago and has grown rapidly.  It has not bled.    Past Medical History:   Diagnosis Date    Hip pain 2016    Hyperlipidemia 2014    Labs of 21 are reviewed---, total chol 255, triglycerides 251, HDL 49.  She has stopped her statin therapy due to her recurrent epistaxis.  Father is alive at age 91 and mother  in her 60s of lung cancer.     Low back pain 2016    Ovarian cyst 2016       Social History     Tobacco Use    Smoking status: Never    Smokeless tobacco: Never   Vaping Use    Vaping status: Never Used   Substance Use Topics    Alcohol use: No     Alcohol/week: 0.0 oz    Drug use: No       Current Outpatient Medications Ordered in Epic   Medication Sig Dispense Refill    levothyroxine (SYNTHROID) 50 MCG Tab TAKE 1 TABLET BY MOUTH EVERY  Tablet 3    Multiple Vitamins-Minerals (PRESERVISION AREDS PO) Take  by mouth.      TURMERIC PO Take 1,500 mg by mouth every day. 2 tabs po qd      Magnesium 400 MG Cap Take  by mouth.      Multiple Vitamins-Minerals (CENTRUM PO) Take  by mouth.      Calcium Citrate (CITRACAL PO) Take  by mouth.      Omega-3 Fatty Acids (FISH OIL PO) Take  by mouth.      Cholecalciferol (VITAMIN  "D-3 PO) Take 2,000 Units by mouth every day.       No current [facility]-ordered facility-administered medications on file.       Allergies:  Patient has no known allergies.    Health Maintenance: Completed    ROS:  Gen: no fevers/chills, no changes in weight  Eyes: no changes in vision  ENT: no sore throat, no hearing loss, no bloody nose  Pulm: no sob, no cough  CV: no chest pain, no palpitations  GI: no nausea/vomiting, no diarrhea  : no dysuria  MSk: no myalgias  Skin: no rash  Neuro: no headaches, no numbness/tingling  Heme/Lymph: no easy bruising      Objective:       Exam:  /70 (BP Location: Right arm, Patient Position: Sitting, BP Cuff Size: Adult)   Pulse 66   Temp 36.6 °C (97.8 °F) (Temporal)   Resp 16   Ht 1.6 m (5' 3\")   Wt 59.9 kg (132 lb)   SpO2 98%   BMI 23.38 kg/m²  Body mass index is 23.38 kg/m².    Gen: Alert and oriented, No apparent distress.  Eyes:   Extraocular motion intact.  No scleral icterus seen.  Ears:    Ear canals and TMs are clear.  Neck: Neck is supple without lymphadenopathy.  Lungs: Normal effort, CTA bilaterally, no wheezes, rhonchi, or rales  CV: Regular rate and rhythm. No murmurs, rubs, or gallops.  No carotid bruits heard.  Abdomen: Soft, nontender, organomegaly or masses.  Ext: No clubbing, cyanosis, edema.  Neuro: Cranial nerves II through VIII are grossly intact.  No lateralized signs are seen.  Gait is normal.  Skin:    Patient has a circular lesion to the right angle of her jaw with raised margins.  The central portion looks keratotic but is covered with make-up.  Difficult to tell if it is an early carcinoma or filiform wart.    Labs: Ordered    Assessment & Plan:     70 y.o. female with the following -     1. Skin lesion of face  This is a new problem.  Given its rapid appearance referral to dermatology will be made for evaluation and treatment.  - Referral to Dermatology    2. Acquired hypothyroidism  This is a chronic problem.  Lab ordered.  - TSH WITH REFLEX " TO FT4; Future    3. Adult general medical exam  Patient's exam was performed.  General health issues addressed.  - Comp Metabolic Panel; Future  - Lipid Profile; Future  - URINALYSIS,CULTURE IF INDICATED; Future  - ESTIMATED GFR; Future  - CBC WITH DIFFERENTIAL; Future    4. Encounter for screening mammogram for malignant neoplasm of breast  This is screening issue.  Mammogram ordered.  - MA-SCREENING MAMMO BILAT W/TOMOSYNTHESIS W/CAD; Future    5. Postmenopausal estrogen deficiency  This is a screening issue.  Mammogram ordered.  - DS-BONE DENSITY STUDY (DEXA); Future    6. Osteopenia of lumbar spine  This is a chronic problem.  Continue on vitamin D and calcium supplementation.      Return in about 6 months (around 11/13/2025) for Long.    Please note that this dictation was created using voice recognition software. I have made every reasonable attempt to correct obvious errors, but I expect that there are errors of grammar and possibly content that I did not discover before finalizing the note.

## 2025-05-13 NOTE — ASSESSMENT & PLAN NOTE
Patient is here for her 6-month follow-up and also has several issues.  She is also due for labs and those will be ordered.  And she is due for her mammogram.

## 2025-05-27 ENCOUNTER — HOSPITAL ENCOUNTER (OUTPATIENT)
Dept: LAB | Facility: MEDICAL CENTER | Age: 70
End: 2025-05-27
Attending: FAMILY MEDICINE
Payer: MEDICARE

## 2025-05-27 DIAGNOSIS — E03.9 ACQUIRED HYPOTHYROIDISM: ICD-10-CM

## 2025-05-27 DIAGNOSIS — Z00.00 ADULT GENERAL MEDICAL EXAM: ICD-10-CM

## 2025-05-27 LAB
ALBUMIN SERPL BCP-MCNC: 4.3 G/DL (ref 3.2–4.9)
ALBUMIN/GLOB SERPL: 1.4 G/DL
ALP SERPL-CCNC: 103 U/L (ref 30–99)
ALT SERPL-CCNC: 23 U/L (ref 2–50)
ANION GAP SERPL CALC-SCNC: 11 MMOL/L (ref 7–16)
APPEARANCE UR: CLEAR
AST SERPL-CCNC: 25 U/L (ref 12–45)
BACTERIA #/AREA URNS HPF: NORMAL /HPF
BASOPHILS # BLD AUTO: 0.6 % (ref 0–1.8)
BASOPHILS # BLD: 0.03 K/UL (ref 0–0.12)
BILIRUB SERPL-MCNC: 0.5 MG/DL (ref 0.1–1.5)
BILIRUB UR QL STRIP.AUTO: NEGATIVE
BUN SERPL-MCNC: 12 MG/DL (ref 8–22)
CALCIUM ALBUM COR SERPL-MCNC: 9 MG/DL (ref 8.5–10.5)
CALCIUM SERPL-MCNC: 9.2 MG/DL (ref 8.5–10.5)
CASTS URNS QL MICRO: NORMAL /LPF (ref 0–2)
CHLORIDE SERPL-SCNC: 103 MMOL/L (ref 96–112)
CHOLEST SERPL-MCNC: 289 MG/DL (ref 100–199)
CO2 SERPL-SCNC: 25 MMOL/L (ref 20–33)
COLOR UR: YELLOW
CREAT SERPL-MCNC: 0.67 MG/DL (ref 0.5–1.4)
EOSINOPHIL # BLD AUTO: 0.02 K/UL (ref 0–0.51)
EOSINOPHIL NFR BLD: 0.4 % (ref 0–6.9)
EPITHELIAL CELLS 1715: NORMAL /HPF (ref 0–5)
ERYTHROCYTE [DISTWIDTH] IN BLOOD BY AUTOMATED COUNT: 41.5 FL (ref 35.9–50)
FASTING STATUS PATIENT QL REPORTED: NORMAL
GFR SERPLBLD CREATININE-BSD FMLA CKD-EPI: 95 ML/MIN/1.73 M 2
GLOBULIN SER CALC-MCNC: 3 G/DL (ref 1.9–3.5)
GLUCOSE SERPL-MCNC: 92 MG/DL (ref 65–99)
GLUCOSE UR STRIP.AUTO-MCNC: NEGATIVE MG/DL
HCT VFR BLD AUTO: 43 % (ref 37–47)
HDLC SERPL-MCNC: 50 MG/DL
HGB BLD-MCNC: 14.3 G/DL (ref 12–16)
IMM GRANULOCYTES # BLD AUTO: 0.02 K/UL (ref 0–0.11)
IMM GRANULOCYTES NFR BLD AUTO: 0.4 % (ref 0–0.9)
KETONES UR STRIP.AUTO-MCNC: NEGATIVE MG/DL
LDLC SERPL CALC-MCNC: 175 MG/DL
LEUKOCYTE ESTERASE UR QL STRIP.AUTO: NEGATIVE
LYMPHOCYTES # BLD AUTO: 1.44 K/UL (ref 1–4.8)
LYMPHOCYTES NFR BLD: 28.3 % (ref 22–41)
MCH RBC QN AUTO: 31.2 PG (ref 27–33)
MCHC RBC AUTO-ENTMCNC: 33.3 G/DL (ref 32.2–35.5)
MCV RBC AUTO: 93.7 FL (ref 81.4–97.8)
MICRO URNS: ABNORMAL
MONOCYTES # BLD AUTO: 0.35 K/UL (ref 0–0.85)
MONOCYTES NFR BLD AUTO: 6.9 % (ref 0–13.4)
NEUTROPHILS # BLD AUTO: 3.23 K/UL (ref 1.82–7.42)
NEUTROPHILS NFR BLD: 63.4 % (ref 44–72)
NITRITE UR QL STRIP.AUTO: NEGATIVE
NRBC # BLD AUTO: 0 K/UL
NRBC BLD-RTO: 0 /100 WBC (ref 0–0.2)
PH UR STRIP.AUTO: 5.5 [PH] (ref 5–8)
PLATELET # BLD AUTO: 264 K/UL (ref 164–446)
PMV BLD AUTO: 10.2 FL (ref 9–12.9)
POTASSIUM SERPL-SCNC: 3.9 MMOL/L (ref 3.6–5.5)
PROT SERPL-MCNC: 7.3 G/DL (ref 6–8.2)
PROT UR QL STRIP: NEGATIVE MG/DL
RBC # BLD AUTO: 4.59 M/UL (ref 4.2–5.4)
RBC # URNS HPF: NORMAL /HPF (ref 0–2)
RBC UR QL AUTO: ABNORMAL
SODIUM SERPL-SCNC: 139 MMOL/L (ref 135–145)
SP GR UR STRIP.AUTO: 1.02
TRIGL SERPL-MCNC: 321 MG/DL (ref 0–149)
TSH SERPL DL<=0.005 MIU/L-ACNC: 1 UIU/ML (ref 0.38–5.33)
UROBILINOGEN UR STRIP.AUTO-MCNC: 0.2 EU/DL
WBC # BLD AUTO: 5.1 K/UL (ref 4.8–10.8)
WBC #/AREA URNS HPF: NORMAL /HPF

## 2025-05-27 PROCEDURE — 81001 URINALYSIS AUTO W/SCOPE: CPT

## 2025-05-27 PROCEDURE — 80053 COMPREHEN METABOLIC PANEL: CPT

## 2025-05-27 PROCEDURE — 80061 LIPID PANEL: CPT

## 2025-05-27 PROCEDURE — 84443 ASSAY THYROID STIM HORMONE: CPT

## 2025-05-27 PROCEDURE — 85025 COMPLETE CBC W/AUTO DIFF WBC: CPT

## 2025-05-27 PROCEDURE — 36415 COLL VENOUS BLD VENIPUNCTURE: CPT

## 2025-05-28 ENCOUNTER — RESULTS FOLLOW-UP (OUTPATIENT)
Dept: MEDICAL GROUP | Facility: PHYSICIAN GROUP | Age: 70
End: 2025-05-28
Payer: MEDICARE

## 2025-06-16 ENCOUNTER — HOSPITAL ENCOUNTER (OUTPATIENT)
Dept: RADIOLOGY | Facility: MEDICAL CENTER | Age: 70
End: 2025-06-16
Attending: FAMILY MEDICINE
Payer: MEDICARE

## 2025-06-16 DIAGNOSIS — Z12.31 ENCOUNTER FOR SCREENING MAMMOGRAM FOR MALIGNANT NEOPLASM OF BREAST: ICD-10-CM

## 2025-06-16 DIAGNOSIS — Z78.0 POSTMENOPAUSAL ESTROGEN DEFICIENCY: ICD-10-CM

## 2025-06-16 PROCEDURE — 77063 BREAST TOMOSYNTHESIS BI: CPT

## 2025-06-16 PROCEDURE — 77080 DXA BONE DENSITY AXIAL: CPT

## 2025-07-11 ENCOUNTER — OFFICE VISIT (OUTPATIENT)
Dept: DERMATOLOGY | Facility: IMAGING CENTER | Age: 70
End: 2025-07-11
Payer: MEDICARE

## 2025-07-11 DIAGNOSIS — L82.1 SEBORRHEIC KERATOSIS: ICD-10-CM

## 2025-07-11 DIAGNOSIS — B07.8 OTHER VIRAL WARTS: ICD-10-CM

## 2025-07-11 DIAGNOSIS — L82.0 INFLAMED SEBORRHEIC KERATOSIS: ICD-10-CM

## 2025-07-11 PROCEDURE — 17110 DESTRUCTION B9 LES UP TO 14: CPT | Performed by: STUDENT IN AN ORGANIZED HEALTH CARE EDUCATION/TRAINING PROGRAM

## 2025-07-11 PROCEDURE — 99203 OFFICE O/P NEW LOW 30 MIN: CPT | Mod: 25 | Performed by: STUDENT IN AN ORGANIZED HEALTH CARE EDUCATION/TRAINING PROGRAM

## 2025-07-11 NOTE — PROGRESS NOTES
[facility] DERMATOLOGY CLINIC NOTE    Chief Complaint   Patient presents with    [facility]    Skin Lesion        HPI:    Jasmin Gates is a 70 y.o. female here for evaluation of Lesion over rt jawline, lesion has been in place less than a year. Doesn't itch or hurt.    Pt has an additional concern of a lesion over back, in place approx 10 years. Pt had cupping over back and lesion appeared after that. Lesion doesn't hurt or itch.    History of skin cancer: No  Family history of skin cancer:No        No other symptomatic (itching, painful, burning) or changing lesions.           Review of Systems: No fevers, chill. Pertinent positives and negatives above.       Medications, Medical History, Surgical History, Family History & Allergies:  Reviewed in the chart, relevant history noted above.       PHYSICAL EXAM  Focused skin exam of face, neck, back, and hands    - 5-8mm grey verrucous papules on the R 3rd finger, R jawline  - tan to erythematous scaly stuck on papule on the upper back  - tan to brown stuck-on waxy papules and plaques on back        ASSESSMENT & PLAN      # Warts  Discussed benign lesions with viral etiology. Treatment can include cryotherapy. Multiple treatment usually needed.  CRYOTHERAPY:  Risks (including, but not limited to: hypo or hyperpigmentation, redness, blister, blood blister, recurrence, need for further treatment, infection, scar) and benefits of cryotherapy discussed. Patient verbally agreed to proceed with treatment. Cryotherapy freeze thaw cycles of 5-10 seconds were applied.  - LN2 x 2 lesion(s) for 3 cycles.  Patient tolerated procedure well. Aftercare instructions given.   - start over the counter Wartstick salicyclic 40%. Apply nightly under occlusion with duct tape or bandaid. Wash off in morning. 3x weekly, increase or decrease based on irritation.    # Inflamed seborrheic keratosis  - reassured benign, but given lesion(s) symptomatic, treatment with cryotherapy discussed and  patient amenable.  CRYOTHERAPY:  Risks (including, but not limited to: hypo or hyperpigmentation, redness, blister, blood blister, recurrence, need for further treatment, infection, scar) and benefits of cryotherapy discussed. Patient verbally agreed to proceed with treatment. Cryotherapy freeze thaw cycles of 5-10 seconds were applied.  - LN2 x 1 lesion(s).  Patient tolerated procedure well. Aftercare instructions given.      # Seborrheic keratosis  - reassure, no treatment needed      Return in about 6 weeks (around 8/22/2025) for Wart.        Leela Reed MD  [facility]

## 2025-08-22 ENCOUNTER — OFFICE VISIT (OUTPATIENT)
Dept: DERMATOLOGY | Facility: IMAGING CENTER | Age: 70
End: 2025-08-22
Payer: MEDICARE

## 2025-08-22 DIAGNOSIS — B07.8 OTHER VIRAL WARTS: ICD-10-CM

## 2025-08-22 DIAGNOSIS — L82.0 INFLAMED SEBORRHEIC KERATOSIS: ICD-10-CM

## 2025-08-22 PROCEDURE — 17110 DESTRUCTION B9 LES UP TO 14: CPT | Performed by: STUDENT IN AN ORGANIZED HEALTH CARE EDUCATION/TRAINING PROGRAM

## 2025-11-13 ENCOUNTER — APPOINTMENT (OUTPATIENT)
Dept: MEDICAL GROUP | Facility: PHYSICIAN GROUP | Age: 70
End: 2025-11-13
Payer: MEDICARE